# Patient Record
Sex: MALE | Race: WHITE | NOT HISPANIC OR LATINO | Employment: FULL TIME | RURAL
[De-identification: names, ages, dates, MRNs, and addresses within clinical notes are randomized per-mention and may not be internally consistent; named-entity substitution may affect disease eponyms.]

---

## 2018-02-28 ENCOUNTER — OFFICE VISIT (OUTPATIENT)
Dept: ORTHOPEDIC SURGERY | Facility: CLINIC | Age: 48
End: 2018-02-28

## 2018-02-28 ENCOUNTER — HOSPITAL ENCOUNTER (OUTPATIENT)
Dept: OTHER | Age: 48
Discharge: HOME | End: 2018-02-28
Attending: ORTHOPAEDIC SURGERY | Admitting: ORTHOPAEDIC SURGERY

## 2018-02-28 VITALS — HEIGHT: 72 IN | WEIGHT: 175 LBS | RESPIRATION RATE: 16 BRPM | BODY MASS INDEX: 23.7 KG/M2

## 2018-02-28 DIAGNOSIS — Z01.811 PRE-OP CHEST EXAM: ICD-10-CM

## 2018-02-28 DIAGNOSIS — S82.852A TRIMALLEOLAR FRACTURE OF LEFT ANKLE, CLOSED, INITIAL ENCOUNTER: Primary | ICD-10-CM

## 2018-02-28 DIAGNOSIS — Z98.890 STATUS POST OPEN REDUCTION WITH INTERNAL FIXATION (ORIF) OF FRACTURE OF ANKLE: Primary | ICD-10-CM

## 2018-02-28 DIAGNOSIS — Z87.81 STATUS POST OPEN REDUCTION WITH INTERNAL FIXATION (ORIF) OF FRACTURE OF ANKLE: Primary | ICD-10-CM

## 2018-02-28 LAB
A/G RATIO: 3.5 (ref 0.8–2)
ALBUMIN SERPL-MCNC: 5.6 G/DL (ref 3.4–4.8)
ALP BLD-CCNC: 152 U/L (ref 25–100)
ALT SERPL-CCNC: 58 U/L (ref 4–36)
ANION GAP SERPL CALCULATED.3IONS-SCNC: 11 MMOL/L (ref 3–16)
AST SERPL-CCNC: 38 U/L (ref 8–33)
BASOPHILS ABSOLUTE: 0.1 K/UL (ref 0–0.1)
BASOPHILS RELATIVE PERCENT: 0.9 %
BILIRUB SERPL-MCNC: 0.7 MG/DL (ref 0.3–1.2)
BUN BLDV-MCNC: 14 MG/DL (ref 6–20)
CALCIUM SERPL-MCNC: 9.2 MG/DL (ref 8.5–10.5)
CHLORIDE BLD-SCNC: 101 MMOL/L (ref 98–107)
CO2: 29 MMOL/L (ref 20–30)
CREAT SERPL-MCNC: 0.8 MG/DL (ref 0.4–1.2)
EOSINOPHILS ABSOLUTE: 0.7 K/UL (ref 0–0.4)
EOSINOPHILS RELATIVE PERCENT: 11.6 %
GFR AFRICAN AMERICAN: >59
GFR NON-AFRICAN AMERICAN: >59
GLOBULIN: 1.6 G/DL
GLUCOSE BLD-MCNC: 106 MG/DL (ref 74–106)
HCT VFR BLD CALC: 41.4 % (ref 40–54)
HEMOGLOBIN: 13.2 G/DL (ref 13–18)
IMMATURE GRANULOCYTES #: 0 K/UL
IMMATURE GRANULOCYTES %: 0.3 % (ref 0–5)
LYMPHOCYTES ABSOLUTE: 2 K/UL (ref 1.5–4)
LYMPHOCYTES RELATIVE PERCENT: 31.9 %
MCH RBC QN AUTO: 30.1 PG (ref 27–32)
MCHC RBC AUTO-ENTMCNC: 31.9 G/DL (ref 31–35)
MCV RBC AUTO: 94.5 FL (ref 80–100)
MONOCYTES ABSOLUTE: 0.9 K/UL (ref 0.2–0.8)
MONOCYTES RELATIVE PERCENT: 14.3 %
NEUTROPHILS ABSOLUTE: 2.6 K/UL (ref 2–7.5)
NEUTROPHILS RELATIVE PERCENT: 41 %
PDW BLD-RTO: 12.3 % (ref 11–16)
PLATELET # BLD: 333 K/UL (ref 150–400)
PMV BLD AUTO: 10 FL (ref 6–10)
POTASSIUM SERPL-SCNC: 4.3 MMOL/L (ref 3.4–5.1)
RBC # BLD: 4.38 M/UL (ref 4.5–6)
SODIUM BLD-SCNC: 141 MMOL/L (ref 136–145)
TOTAL PROTEIN: 7.2 G/DL (ref 6.4–8.3)
WBC # BLD: 6.4 K/UL (ref 4–11)

## 2018-02-28 PROCEDURE — 99203 OFFICE O/P NEW LOW 30 MIN: CPT | Performed by: ORTHOPAEDIC SURGERY

## 2018-02-28 RX ORDER — IBUPROFEN 200 MG
400 TABLET ORAL EVERY 6 HOURS PRN
COMMUNITY

## 2018-02-28 RX ORDER — HYDROCODONE BITARTRATE AND ACETAMINOPHEN 7.5; 325 MG/1; MG/1
1 TABLET ORAL EVERY 6 HOURS PRN
COMMUNITY
Start: 2018-02-25 | End: 2018-03-01 | Stop reason: HOSPADM

## 2018-02-28 RX ORDER — HYDROCODONE BITARTRATE AND ACETAMINOPHEN 7.5; 325 MG/1; MG/1
1 TABLET ORAL EVERY 6 HOURS PRN
Qty: 40 TABLET | Refills: 0 | Status: SHIPPED | OUTPATIENT
Start: 2018-02-28

## 2018-02-28 NOTE — PROGRESS NOTES
"Long Arthur is a 48 y.o. male referred by [facility] for orthopaedic evaluation and management of left ankle fracture and is here today for a discussion of treatment plans, surgery, and rehabilitation.  Pain of the Left Ankle       Leg Injury   The current episode started in the past 7 days (2-24-28 Patient states he slipped from his porch steps in the wet rain, fell and injured his left ankle.  Seen in ER for ankle fracture and treated with appropriate initial care, immobilization, NWB crutches, RICE therapy and pain control. ). The problem occurs intermittently (stabbing pain). The problem has been gradually improving. Associated symptoms include myalgias (mild right forearm minor soft tissue contusion healing well and stable.). Pertinent negatives include no abdominal pain, chest pain, fever, joint swelling or rash. Arthralgias: ankle. The symptoms are aggravated by stress, standing and walking. He has tried rest, immobilization, ice and NSAIDs (ibuprofen, elevation, non weight bearing with crutches.) for the symptoms. The treatment provided mild relief.     PAST MEDICAL HISTORY:    Past Medical History:   Diagnosis Date   • H/O exercise stress test 2016    DONE AT [facility]- \"IT WAS NORMAL\"   • Problems with swallowing     FOOD   • Wears dentures     UPPER PLATE      He reports he is in good health.  No recent illness or chronic conditions.      Current Outpatient Prescriptions:   •  HYDROcodone-acetaminophen (NORCO) 7.5-325 MG per tablet, Take 1 tablet by mouth Every 6 (Six) Hours As Needed., Disp: , Rfl:   •  HYDROcodone-acetaminophen (NORCO) 7.5-325 MG per tablet, Take 1 tablet by mouth Every 6 (Six) Hours As Needed for Moderate Pain  (PRN POST OP PAIN)., Disp: 40 tablet, Rfl: 0  •  ibuprofen (ADVIL,MOTRIN) 200 MG tablet, Take 400 mg by mouth Every 6 (Six) Hours As Needed for Mild Pain ., Disp: , Rfl:     Past Surgical History:   Procedure Laterality Date   • MOUTH SURGERY      SOME " "EXTRACTIONS       Family History   Problem Relation Age of Onset   • Family history unknown: Yes       Social History     Social History   • Marital status:      Spouse name: N/A   • Number of children: N/A   • Years of education: N/A     Occupational History   • [employer]     Social History Main Topics   • Smoking status: Former Smoker     Packs/day: 2.00     Years: 25.00     Types: Cigarettes     Quit date: 2/28/2013   • Smokeless tobacco: Never Used   • Alcohol use Yes      Comment: social   • Drug use: No   • Sexual activity: Defer     Other Topics Concern   • Not on file     Social History Narrative        Allergies   Allergen Reactions   • Penicillins Hives and Anaphylaxis     Pt states he has had this allergy since childhood       Review of Systems   Constitutional: Negative for fever.   HENT: Negative for voice change.    Eyes: Negative for visual disturbance.   Respiratory: Negative for shortness of breath.    Cardiovascular: Negative for chest pain.   Gastrointestinal: Negative for abdominal distention and abdominal pain.   Genitourinary: Negative for dysuria.   Musculoskeletal: Positive for myalgias (mild right forearm minor soft tissue contusion healing well and stable.). Negative for gait problem and joint swelling. Arthralgias: ankle.   Skin: Negative for rash.        No skin breakdown.  No fracture blisters have developed as of yet.   Neurological: Negative for speech difficulty.   Hematological: Does not bruise/bleed easily.   Psychiatric/Behavioral: Negative for confusion.       PHYSICAL EXAMINATION:       Resp 16  Ht 182.9 cm (72\")  Wt 79.4 kg (175 lb)  BMI 23.73 kg/m2    GENERAL [x] Well developed  []Ill appearing [x] No acute distress    HEENT [x]No acute changes [x] Normocephalic, atraumatic    NECK [x]Supple  [] No midline tenderness    LUNGS [x]Clear bilaterally [x]No wheezes []Rhonchi [] Rales    HEART [x] Regular rate  [x] Regular rhythm [] Irregular  "   ABDOMEN [x] Soft [x] Not tender [x] Not distended [x] Normal sounds    VAS/EXT [x] Normal Pulses []Edema []Cyanosis             SKIN [x] Warm [x]Dry []Pink []Ecchymosis []Cool    NEURO [x] Sensation Grossly Intact [x] Motor Grossly Intact [x] Pulse intact    MUSCULOSKELETAL []          Physical Exam   Constitutional: He appears well-developed. No distress.   HENT:   Head: Normocephalic and atraumatic.   Eyes: EOM are normal. Pupils are equal, round, and reactive to light.   Neck: Neck supple. No tracheal deviation present.   Cardiovascular: Normal rate and regular rhythm.    Pulmonary/Chest: Breath sounds normal. No respiratory distress. He has no wheezes.   Abdominal: Soft. Bowel sounds are normal. He exhibits no distension. There is no tenderness.   Neurological: He is alert.   Skin: Skin is warm and dry.   Psychiatric: He has a normal mood and affect. His speech is normal.   Vitals reviewed.    Left Ankle Exam   Swelling: moderate    Tenderness   Left ankle tenderness location: diffuse pain with soft moderate swelling and no tense lower leg or foot and no compartment syndrome signs.     Muscle Strength   Dorsiflexion:  4/5   Plantar flexion:  4/5     Other   Erythema: absent  Pulse: present           Neurologic Exam     Mental Status   Attention: normal.   Speech: speech is normal   Level of consciousness: alert  Knowledge: good.     Cranial Nerves     CN III, IV, VI   Pupils are equal, round, and reactive to light.  Extraocular motions are normal.     Motor Exam   Overall muscle tone: normal    Ortho Exam           Independent Review of Radiographic Studies:    No new imaging done today.  Reviewed imaging from ER showing comminuted displaced unstable trimalleolar fracture of left ankle.  Laboratory and Other Studies:  No new results reviewed today.   Medical Decision Making:    Stable initial neurovascular and fracture exam.  Initial visit for left ankle fracture with recommendation for surgical  ORIF.      *SPECIAL INSTRUCTIONS:  Multi-modal analgesia.  Multi-modal DVT prophylaxis.  Rehabilitation PT/OT planned.    Long was seen today for pain.    Diagnoses and all orders for this visit:    Trimalleolar fracture of left ankle, closed, initial encounter  -     Case Request; Standing  -     CBC and Differential  -     Comprehensive metabolic panel  -     ECG 12 Lead  -     XR chest 2 vw; Future  -     famotidine (PEPCID) injection 20 mg; Infuse 2 mL into a venous catheter 60 Minutes Prior to Surgery.  -     clindamycin (CLEOCIN) 900 mg in dextrose (D5W) 5 % 100 mL IVPB; Infuse 900 mg into a venous catheter On Call to the Operating Room.  -     Case Request    Other orders  -     Follow anesthesia standing orders.  -     Provide instructions to patient regarding NPO status  -     Obtain informed consent  -     Clorhexidine skin prep  -     Follow Anesthesia Guidelines / Standing Orders; Standing  -     Notify physician (specify); Standing          Risks, benefits, and alternative treatments discussed with the patient: [x] Yes [] No    Risk benefits and merits of the proposed surgery were discussed and the patient's questions were answered risks discussed including and not limited to:  Anesthesia reactions  Infection  Deep venous thrombosis and pulmonary embolus  Nerve, vascular or tendon injury  Fracture  Deformity  Stiffness  Weakness  Prosthesis and implant issues such as loosening, material wear or dislocation  Skin necrosis  Revision surgery or further treatment  Recurrence of problem and condition     Informed consent: [] Signed  [x] To be obtained at hospital  [] Both    Recommendations/Plan:    Regular exercise as tolerated  Orthopedic activities reviewed and patient expressed appreciation  Discussion of orthopedic goals  Risk, benefits, and merits of treatment alternatives reviewed with the patient and questions answered  The nature of the proposed surgery reviewed with the patient including risks,  benefits, rehabilitation, recovery timeframe, and outcome expectations  Take prescribed medications as instructed only as tolerated  Use brace as instructed  Elevate leg for residual swelling  Reduced physical activity as appropriate  Weight bearing parameters reviewed  Ice, heat, and/or modalities as beneficial  Guided on proper techniques for mobility, strength, agility and/or conditioning exercises    Exercise, medications, injections, other patient advice, and return appointment as noted.  Brace: No brace was given at today's visit  Referral: No referrals made at today's visit  Test/Studies: No additional studies ordered.  Surgery: Surgery proposed at this visit as noted.  Work/Activity Status: No strenuous activity., No contact sports and No driving on pain medication  Patient is encouraged to call or return for any issues or concerns.    PLANNED SURGICAL PROCEDURE: Open Reduction Internal Fixation of Left Ankle Trimalleolar Fracture.    Return in about 9 days (around 3/9/2018) for Post-op, Recheck.

## 2018-02-28 NOTE — PROGRESS NOTES
Subjective   Patient ID: Long Arthur is a 48 y.o. right hand dominant male is being seen for orthopaedic evaluation today for left ankle Pain of the Left Ankle         HPI Comments: Patient fell on front porch Saturday night after being in rain. Patient is taking ibuprofen and resting with limited relief.      Pain   This is a new problem. The current episode started in the past 7 days. The problem occurs intermittently. The problem has been unchanged. Associated symptoms include arthralgias. Pertinent negatives include no abdominal pain, chest pain, fever, joint swelling or rash. The symptoms are aggravated by standing. He has tried NSAIDs and ice (Ibuprophen) for the symptoms. The treatment provided mild relief.       Pain Score: 7  Pain Location: Ankle  Pain Orientation: Left     Pain Descriptors: Stabbing  Pain Frequency: Intermittent  Pain Onset: Sudden  Date Pain First Started: 02/24/18  Clinical Progression: Not changed  Aggravating Factors: Walking, Standing        Pain Intervention(s): Rest, Home medication (ibuprophen)  Result of Injury: Yes  Work-Related Injury: No    History reviewed. No pertinent past medical history.     History reviewed. No pertinent surgical history.    Family History   Problem Relation Age of Onset   • Family history unknown: Yes       Social History     Social History   • Marital status:      Spouse name: N/A   • Number of children: N/A   • Years of education: N/A     Occupational History   • Not on file.     Social History Main Topics   • Smoking status: Former Smoker     Quit date: 2/28/2013   • Smokeless tobacco: Never Used   • Alcohol use Yes      Comment: social   • Drug use: No   • Sexual activity: Defer     Other Topics Concern   • Not on file     Social History Narrative   • No narrative on file       Allergies   Allergen Reactions   • Penicillins Hives       Review of Systems   Constitutional: Negative for fever.   HENT: Negative for voice change.    Eyes: Negative  "for visual disturbance.   Respiratory: Negative for shortness of breath.    Cardiovascular: Negative for chest pain.   Gastrointestinal: Negative for abdominal distention and abdominal pain.   Genitourinary: Negative for dysuria.   Musculoskeletal: Positive for arthralgias. Negative for gait problem and joint swelling.   Skin: Negative for rash.   Neurological: Negative for speech difficulty.   Hematological: Does not bruise/bleed easily.   Psychiatric/Behavioral: Negative for confusion.       Objective   Resp 16  Ht 182.9 cm (72\")  Wt 79.4 kg (175 lb)  BMI 23.73 kg/m2   Physical Exam  Ortho Exam   Extremity DVT signs are {[facility] Justo:79083}   Neurologic Exam   Ortho Exam         Assessment/Plan   Independent Review of Radiographic Studies:    {[facility] Imagin}  Laboratory and Other Studies:  {[facility] Lab Studies:10282}   Medical Decision Making:    {[facility] Clinical Progress, Decision Making, and Outcome:92614}    Procedures  [] No procedures were performed in office today.     There are no diagnoses linked to this encounter.   {[facility] Patient Advice:74887::\"Regular exercise as tolerated\",\"Orthopedic activities reviewed and patient expressed appreciation\",\"Discussion of orthopedic goals\",\"Risk, benefits, and merits of treatment alternatives reviewed with the patient and questions answered\"}    Recommendations/Plan:  {[facility] Recommendations/Plan:61618::\"Exercise, medications, injections, other patient advice, and return appointment as noted.\",\"Patient is encouraged to call or return for any issues or concerns.\"}    No Follow-up on file.  Patient agreeable to call or return sooner for any concerns.      Scribed for Joss Guerrier MD by Verna Valentine R.N.. 2018  10:16 AM                "

## 2018-03-01 ENCOUNTER — APPOINTMENT (OUTPATIENT)
Dept: GENERAL RADIOLOGY | Facility: HOSPITAL | Age: 48
End: 2018-03-01

## 2018-03-01 ENCOUNTER — HOSPITAL ENCOUNTER (OUTPATIENT)
Facility: HOSPITAL | Age: 48
Setting detail: HOSPITAL OUTPATIENT SURGERY
Discharge: HOME | End: 2018-03-01
Attending: ORTHOPAEDIC SURGERY | Admitting: ORTHOPAEDIC SURGERY

## 2018-03-01 ENCOUNTER — ANESTHESIA (OUTPATIENT)
Dept: PERIOP | Facility: HOSPITAL | Age: 48
End: 2018-03-01

## 2018-03-01 ENCOUNTER — ANESTHESIA EVENT (OUTPATIENT)
Dept: PERIOP | Facility: HOSPITAL | Age: 48
End: 2018-03-01

## 2018-03-01 VITALS
DIASTOLIC BLOOD PRESSURE: 79 MMHG | TEMPERATURE: 97.9 F | SYSTOLIC BLOOD PRESSURE: 142 MMHG | RESPIRATION RATE: 16 BRPM | WEIGHT: 175 LBS | OXYGEN SATURATION: 96 % | HEIGHT: 72 IN | HEART RATE: 81 BPM | BODY MASS INDEX: 23.7 KG/M2

## 2018-03-01 DIAGNOSIS — S82.852A TRIMALLEOLAR FRACTURE OF LEFT ANKLE, CLOSED, INITIAL ENCOUNTER: ICD-10-CM

## 2018-03-01 PROCEDURE — 27822 TREATMENT OF ANKLE FRACTURE: CPT | Performed by: ORTHOPAEDIC SURGERY

## 2018-03-01 PROCEDURE — C1713 ANCHOR/SCREW BN/BN,TIS/BN: HCPCS | Performed by: ORTHOPAEDIC SURGERY

## 2018-03-01 PROCEDURE — 25010000002 FENTANYL CITRATE (PF) 100 MCG/2ML SOLUTION: Performed by: NURSE ANESTHETIST, CERTIFIED REGISTERED

## 2018-03-01 PROCEDURE — 76000 FLUOROSCOPY <1 HR PHYS/QHP: CPT

## 2018-03-01 PROCEDURE — 25010000002 ONDANSETRON PER 1 MG: Performed by: NURSE ANESTHETIST, CERTIFIED REGISTERED

## 2018-03-01 PROCEDURE — 25010000002 PROPOFOL 200 MG/20ML EMULSION: Performed by: NURSE ANESTHETIST, CERTIFIED REGISTERED

## 2018-03-01 PROCEDURE — 25010000002 DEXAMETHASONE PER 1 MG: Performed by: NURSE ANESTHETIST, CERTIFIED REGISTERED

## 2018-03-01 PROCEDURE — 25010000002 MIDAZOLAM PER 1 MG: Performed by: NURSE ANESTHETIST, CERTIFIED REGISTERED

## 2018-03-01 DEVICE — PLT TBG 1/3 LCP W COL 7HL 81MM: Type: IMPLANTABLE DEVICE | Site: ANKLE | Status: FUNCTIONAL

## 2018-03-01 DEVICE — SCRW CANC LCP PT SS 4X50MM: Type: IMPLANTABLE DEVICE | Site: ANKLE | Status: FUNCTIONAL

## 2018-03-01 DEVICE — ISOLA SPINE SYSTEM ROD BENDERS (TUBULAR) SET
Type: IMPLANTABLE DEVICE | Site: ANKLE | Status: FUNCTIONAL
Brand: ISOLA

## 2018-03-01 DEVICE — SCRW LK S/TAP STRDRV 3.5X14MM: Type: IMPLANTABLE DEVICE | Site: ANKLE | Status: FUNCTIONAL

## 2018-03-01 DEVICE — SCRW CANC FUL/THRD 4.0X14MM: Type: IMPLANTABLE DEVICE | Site: ANKLE | Status: FUNCTIONAL

## 2018-03-01 DEVICE — SCRW CANC LCP PT 4X45MM: Type: IMPLANTABLE DEVICE | Site: ANKLE | Status: FUNCTIONAL

## 2018-03-01 DEVICE — SCRW CORT S/TAP 3.5X14MM: Type: IMPLANTABLE DEVICE | Site: ANKLE | Status: FUNCTIONAL

## 2018-03-01 DEVICE — SCRW LK S/TAP STRDRV 3.5X16MM: Type: IMPLANTABLE DEVICE | Site: ANKLE | Status: FUNCTIONAL

## 2018-03-01 DEVICE — SCRW CORT S/TAP 3.5X12MM: Type: IMPLANTABLE DEVICE | Site: ANKLE | Status: FUNCTIONAL

## 2018-03-01 RX ORDER — MORPHINE SULFATE 2 MG/ML
2 INJECTION, SOLUTION INTRAMUSCULAR; INTRAVENOUS
Status: DISCONTINUED | OUTPATIENT
Start: 2018-03-01 | End: 2018-03-01 | Stop reason: HOSPADM

## 2018-03-01 RX ORDER — ONDANSETRON 2 MG/ML
INJECTION INTRAMUSCULAR; INTRAVENOUS AS NEEDED
Status: DISCONTINUED | OUTPATIENT
Start: 2018-03-01 | End: 2018-03-01 | Stop reason: SURG

## 2018-03-01 RX ORDER — CLINDAMYCIN PHOSPHATE 150 MG/ML
INJECTION, SOLUTION INTRAVENOUS AS NEEDED
Status: DISCONTINUED | OUTPATIENT
Start: 2018-03-01 | End: 2018-03-01 | Stop reason: HOSPADM

## 2018-03-01 RX ORDER — PROPOFOL 10 MG/ML
INJECTION, EMULSION INTRAVENOUS AS NEEDED
Status: DISCONTINUED | OUTPATIENT
Start: 2018-03-01 | End: 2018-03-01 | Stop reason: SURG

## 2018-03-01 RX ORDER — MAGNESIUM HYDROXIDE 1200 MG/15ML
LIQUID ORAL AS NEEDED
Status: DISCONTINUED | OUTPATIENT
Start: 2018-03-01 | End: 2018-03-01 | Stop reason: HOSPADM

## 2018-03-01 RX ORDER — ONDANSETRON 2 MG/ML
4 INJECTION INTRAMUSCULAR; INTRAVENOUS ONCE AS NEEDED
Status: DISCONTINUED | OUTPATIENT
Start: 2018-03-01 | End: 2018-03-01 | Stop reason: HOSPADM

## 2018-03-01 RX ORDER — FENTANYL CITRATE 50 UG/ML
INJECTION, SOLUTION INTRAMUSCULAR; INTRAVENOUS AS NEEDED
Status: DISCONTINUED | OUTPATIENT
Start: 2018-03-01 | End: 2018-03-01 | Stop reason: SURG

## 2018-03-01 RX ORDER — SODIUM CHLORIDE, SODIUM LACTATE, POTASSIUM CHLORIDE, CALCIUM CHLORIDE 600; 310; 30; 20 MG/100ML; MG/100ML; MG/100ML; MG/100ML
1000 INJECTION, SOLUTION INTRAVENOUS CONTINUOUS PRN
Status: DISCONTINUED | OUTPATIENT
Start: 2018-03-01 | End: 2018-03-01 | Stop reason: HOSPADM

## 2018-03-01 RX ORDER — SODIUM CHLORIDE 0.9 % (FLUSH) 0.9 %
3 SYRINGE (ML) INJECTION AS NEEDED
Status: DISCONTINUED | OUTPATIENT
Start: 2018-03-01 | End: 2018-03-01 | Stop reason: HOSPADM

## 2018-03-01 RX ORDER — ACETAMINOPHEN 500 MG
1000 TABLET ORAL EVERY 6 HOURS PRN
COMMUNITY
End: 2018-03-01 | Stop reason: HOSPADM

## 2018-03-01 RX ORDER — FAMOTIDINE 10 MG/ML
INJECTION, SOLUTION INTRAVENOUS
Status: COMPLETED
Start: 2018-03-01 | End: 2018-03-01

## 2018-03-01 RX ORDER — MIDAZOLAM HYDROCHLORIDE 1 MG/ML
INJECTION INTRAMUSCULAR; INTRAVENOUS AS NEEDED
Status: DISCONTINUED | OUTPATIENT
Start: 2018-03-01 | End: 2018-03-01 | Stop reason: SURG

## 2018-03-01 RX ORDER — DEXAMETHASONE SODIUM PHOSPHATE 4 MG/ML
INJECTION, SOLUTION INTRA-ARTICULAR; INTRALESIONAL; INTRAMUSCULAR; INTRAVENOUS; SOFT TISSUE AS NEEDED
Status: DISCONTINUED | OUTPATIENT
Start: 2018-03-01 | End: 2018-03-01 | Stop reason: SURG

## 2018-03-01 RX ORDER — MEPERIDINE HYDROCHLORIDE 50 MG/ML
25 INJECTION INTRAMUSCULAR; INTRAVENOUS; SUBCUTANEOUS
Status: DISCONTINUED | OUTPATIENT
Start: 2018-03-01 | End: 2018-03-01 | Stop reason: HOSPADM

## 2018-03-01 RX ORDER — CLINDAMYCIN PHOSPHATE 150 MG/ML
INJECTION, SOLUTION INTRAVENOUS
Status: DISCONTINUED
Start: 2018-03-01 | End: 2018-03-01 | Stop reason: HOSPADM

## 2018-03-01 RX ORDER — CLINDAMYCIN PHOSPHATE 900 MG/50ML
900 INJECTION, SOLUTION INTRAVENOUS
Status: COMPLETED | OUTPATIENT
Start: 2018-03-01 | End: 2018-03-01

## 2018-03-01 RX ADMIN — FAMOTIDINE 20 MG: 10 INJECTION INTRAVENOUS at 10:53

## 2018-03-01 RX ADMIN — PROPOFOL 200 MG: 10 INJECTION, EMULSION INTRAVENOUS at 12:10

## 2018-03-01 RX ADMIN — Medication 8 ML/HR: at 14:38

## 2018-03-01 RX ADMIN — SODIUM CHLORIDE, POTASSIUM CHLORIDE, SODIUM LACTATE AND CALCIUM CHLORIDE 1000 ML: 600; 310; 30; 20 INJECTION, SOLUTION INTRAVENOUS at 10:52

## 2018-03-01 RX ADMIN — CLINDAMYCIN PHOSPHATE 900 MG: 900 INJECTION, SOLUTION INTRAVENOUS at 12:07

## 2018-03-01 RX ADMIN — DEXAMETHASONE SODIUM PHOSPHATE 4 MG: 4 INJECTION, SOLUTION INTRAMUSCULAR; INTRAVENOUS at 12:16

## 2018-03-01 RX ADMIN — FENTANYL CITRATE 100 MCG: 50 INJECTION, SOLUTION INTRAMUSCULAR; INTRAVENOUS at 12:10

## 2018-03-01 RX ADMIN — MIDAZOLAM HYDROCHLORIDE 2 MG: 1 INJECTION, SOLUTION INTRAMUSCULAR; INTRAVENOUS at 12:10

## 2018-03-01 RX ADMIN — ONDANSETRON 4 MG: 2 INJECTION INTRAMUSCULAR; INTRAVENOUS at 12:16

## 2018-03-01 NOTE — H&P (VIEW-ONLY)
"Long Arthur is a 48 y.o. male referred by [facility] ER for orthopaedic evaluation and management of left ankle fracture and is here today for a discussion of treatment plans, surgery, and rehabilitation.  Pain of the Left Ankle       Leg Injury   The current episode started in the past 7 days (2-24-28 Patient states he slipped from his porch steps in the wet rain, fell and injured his left ankle.  Seen in ER for ankle fracture and treated with appropriate initial care, immobilization, NWB crutches, RICE therapy and pain control. ). The problem occurs intermittently (stabbing pain). The problem has been gradually improving. Associated symptoms include myalgias (mild right forearm minor soft tissue contusion healing well and stable.). Pertinent negatives include no abdominal pain, chest pain, fever, joint swelling or rash. Arthralgias: ankle. The symptoms are aggravated by stress, standing and walking. He has tried rest, immobilization, ice and NSAIDs (ibuprofen, elevation, non weight bearing with crutches.) for the symptoms. The treatment provided mild relief.     PAST MEDICAL HISTORY:    Past Medical History:   Diagnosis Date   • H/O exercise stress test 2016    DONE AT [facility]- \"IT WAS NORMAL\"   • Problems with swallowing     FOOD   • Wears dentures     UPPER PLATE      He reports he is in good health.  No recent illness or chronic conditions.      Current Outpatient Prescriptions:   •  HYDROcodone-acetaminophen (NORCO) 7.5-325 MG per tablet, Take 1 tablet by mouth Every 6 (Six) Hours As Needed., Disp: , Rfl:   •  HYDROcodone-acetaminophen (NORCO) 7.5-325 MG per tablet, Take 1 tablet by mouth Every 6 (Six) Hours As Needed for Moderate Pain  (PRN POST OP PAIN)., Disp: 40 tablet, Rfl: 0  •  ibuprofen (ADVIL,MOTRIN) 200 MG tablet, Take 400 mg by mouth Every 6 (Six) Hours As Needed for Mild Pain ., Disp: , Rfl:     Past Surgical History:   Procedure Laterality Date   • MOUTH SURGERY      SOME " "EXTRACTIONS       Family History   Problem Relation Age of Onset   • Family history unknown: Yes       Social History     Social History   • Marital status:      Spouse name: N/A   • Number of children: N/A   • Years of education: N/A     Occupational History   • [employer]     Social History Main Topics   • Smoking status: Former Smoker     Packs/day: 2.00     Years: 25.00     Types: Cigarettes     Quit date: 2/28/2013   • Smokeless tobacco: Never Used   • Alcohol use Yes      Comment: social   • Drug use: No   • Sexual activity: Defer     Other Topics Concern   • Not on file     Social History Narrative        Allergies   Allergen Reactions   • Penicillins Hives and Anaphylaxis     Pt states he has had this allergy since childhood       Review of Systems   Constitutional: Negative for fever.   HENT: Negative for voice change.    Eyes: Negative for visual disturbance.   Respiratory: Negative for shortness of breath.    Cardiovascular: Negative for chest pain.   Gastrointestinal: Negative for abdominal distention and abdominal pain.   Genitourinary: Negative for dysuria.   Musculoskeletal: Positive for myalgias (mild right forearm minor soft tissue contusion healing well and stable.). Negative for gait problem and joint swelling. Arthralgias: ankle.   Skin: Negative for rash.        No skin breakdown.  No fracture blisters have developed as of yet.   Neurological: Negative for speech difficulty.   Hematological: Does not bruise/bleed easily.   Psychiatric/Behavioral: Negative for confusion.       PHYSICAL EXAMINATION:       Resp 16  Ht 182.9 cm (72\")  Wt 79.4 kg (175 lb)  BMI 23.73 kg/m2    GENERAL [x] Well developed  []Ill appearing [x] No acute distress    HEENT [x]No acute changes [x] Normocephalic, atraumatic    NECK [x]Supple  [] No midline tenderness    LUNGS [x]Clear bilaterally [x]No wheezes []Rhonchi [] Rales    HEART [x] Regular rate  [x] Regular rhythm [] Irregular  "   ABDOMEN [x] Soft [x] Not tender [x] Not distended [x] Normal sounds    VAS/EXT [x] Normal Pulses []Edema []Cyanosis             SKIN [x] Warm [x]Dry []Pink []Ecchymosis []Cool    NEURO [x] Sensation Grossly Intact [x] Motor Grossly Intact [x] Pulse intact    MUSCULOSKELETAL []          Physical Exam   Constitutional: He appears well-developed. No distress.   HENT:   Head: Normocephalic and atraumatic.   Eyes: EOM are normal. Pupils are equal, round, and reactive to light.   Neck: Neck supple. No tracheal deviation present.   Cardiovascular: Normal rate and regular rhythm.    Pulmonary/Chest: Breath sounds normal. No respiratory distress. He has no wheezes.   Abdominal: Soft. Bowel sounds are normal. He exhibits no distension. There is no tenderness.   Neurological: He is alert.   Skin: Skin is warm and dry.   Psychiatric: He has a normal mood and affect. His speech is normal.   Vitals reviewed.    Left Ankle Exam   Swelling: moderate    Tenderness   Left ankle tenderness location: diffuse pain with soft moderate swelling and no tense lower leg or foot and no compartment syndrome signs.     Muscle Strength   Dorsiflexion:  4/5   Plantar flexion:  4/5     Other   Erythema: absent  Pulse: present           Neurologic Exam     Mental Status   Attention: normal.   Speech: speech is normal   Level of consciousness: alert  Knowledge: good.     Cranial Nerves     CN III, IV, VI   Pupils are equal, round, and reactive to light.  Extraocular motions are normal.     Motor Exam   Overall muscle tone: normal    Ortho Exam           Independent Review of Radiographic Studies:    No new imaging done today.  Reviewed imaging from ER showing comminuted displaced unstable trimalleolar fracture of left ankle.  Laboratory and Other Studies:  No new results reviewed today.   Medical Decision Making:    Stable initial neurovascular and fracture exam.  Initial visit for left ankle fracture with recommendation for surgical  ORIF.      *SPECIAL INSTRUCTIONS:  Multi-modal analgesia.  Multi-modal DVT prophylaxis.  Rehabilitation PT/OT planned.    Long was seen today for pain.    Diagnoses and all orders for this visit:    Trimalleolar fracture of left ankle, closed, initial encounter  -     Case Request; Standing  -     CBC and Differential  -     Comprehensive metabolic panel  -     ECG 12 Lead  -     XR chest 2 vw; Future  -     famotidine (PEPCID) injection 20 mg; Infuse 2 mL into a venous catheter 60 Minutes Prior to Surgery.  -     clindamycin (CLEOCIN) 900 mg in dextrose (D5W) 5 % 100 mL IVPB; Infuse 900 mg into a venous catheter On Call to the Operating Room.  -     Case Request    Other orders  -     Follow anesthesia standing orders.  -     Provide instructions to patient regarding NPO status  -     Obtain informed consent  -     Clorhexidine skin prep  -     Follow Anesthesia Guidelines / Standing Orders; Standing  -     Notify physician (specify); Standing          Risks, benefits, and alternative treatments discussed with the patient: [x] Yes [] No    Risk benefits and merits of the proposed surgery were discussed and the patient's questions were answered risks discussed including and not limited to:  Anesthesia reactions  Infection  Deep venous thrombosis and pulmonary embolus  Nerve, vascular or tendon injury  Fracture  Deformity  Stiffness  Weakness  Prosthesis and implant issues such as loosening, material wear or dislocation  Skin necrosis  Revision surgery or further treatment  Recurrence of problem and condition     Informed consent: [] Signed  [x] To be obtained at hospital  [] Both    Recommendations/Plan:    Regular exercise as tolerated  Orthopedic activities reviewed and patient expressed appreciation  Discussion of orthopedic goals  Risk, benefits, and merits of treatment alternatives reviewed with the patient and questions answered  The nature of the proposed surgery reviewed with the patient including risks,  benefits, rehabilitation, recovery timeframe, and outcome expectations  Take prescribed medications as instructed only as tolerated  Use brace as instructed  Elevate leg for residual swelling  Reduced physical activity as appropriate  Weight bearing parameters reviewed  Ice, heat, and/or modalities as beneficial  Guided on proper techniques for mobility, strength, agility and/or conditioning exercises    Exercise, medications, injections, other patient advice, and return appointment as noted.  Brace: No brace was given at today's visit  Referral: No referrals made at today's visit  Test/Studies: No additional studies ordered.  Surgery: Surgery proposed at this visit as noted.  Work/Activity Status: No strenuous activity., No contact sports and No driving on pain medication  Patient is encouraged to call or return for any issues or concerns.    PLANNED SURGICAL PROCEDURE: Open Reduction Internal Fixation of Left Ankle Trimalleolar Fracture.    Return in about 9 days (around 3/9/2018) for Post-op, Recheck.

## 2018-03-01 NOTE — PLAN OF CARE
Problem: Perioperative Period (Adult)  Intervention: Promote Pulmonary Hygiene and Secretion Clearance   03/01/18 1412   Promote Aggressive Pulmonary Hygiene/Secretion Management   Cough And Deep Breathing done with encouragement   Positioning   Head Of Bed (HOB) Position HOB at 20 degrees   Activity   Activity Type activity adjusted per tolerance;dorsiflexion, plantar flexion encouraged     Intervention: Monitor/Manage Pain   03/01/18 1412   Safety Interventions   Medication Review/Management medications reviewed   Manage Acute Burn Pain   Bowel Intervention adequate fluid intake promoted   Pain Management Interventions cold applied;pillow support;pain care plan reviewed with patient/caregiver     Intervention: Promote Normothermia   03/01/18 1412   Cardiac Interventions   Warming Thermoregulation Maintenance warm blankets applied       Goal: Signs and Symptoms of Listed Potential Problems Will be Absent or Manageable (Perioperative Period)  Outcome: Ongoing (interventions implemented as appropriate)   03/01/18 1412   Perioperative Period   Problems Assessed (Perioperative Period) all   Problems Present (Perioperative Period) none

## 2018-03-01 NOTE — INTERVAL H&P NOTE
"H&P reviewed. The patient was examined and there are no changes to the H&P.     Ht 182.9 cm (72\")  Wt 79.4 kg (175 lb)  BMI 23.73 kg/m2      Joss Guerrier MD  3/1/2018  10:33 AM    "

## 2018-03-01 NOTE — ANESTHESIA PREPROCEDURE EVALUATION
Anesthesia Evaluation     Patient summary reviewed and Nursing notes reviewed   no history of anesthetic complications:  NPO Solid Status: > 8 hours  NPO Liquid Status: > 8 hours           Airway   Mallampati: I  TM distance: >3 FB  Neck ROM: full  no difficulty expected  Dental - normal exam   (+) upper dentures    Pulmonary - negative pulmonary ROS and normal exam   Cardiovascular - negative cardio ROS and normal exam        Neuro/Psych- negative ROS  GI/Hepatic/Renal/Endo - negative ROS     Musculoskeletal (-) negative ROS    Abdominal    Substance History - negative use     OB/GYN negative ob/gyn ROS         Other - negative ROS                     Anesthesia Plan    ASA 2     general   (Discussed popliteal block/saphenous nerve block for postop pain management.  Patient agrees to this and we will provide block postoperative in PACU.  Kali Goyal CRNA)  intravenous induction   Anesthetic plan and risks discussed with patient.

## 2018-03-01 NOTE — DISCHARGE INSTRUCTIONS
"ON-Q PATIENT INSTRUCTIONS    You have had regional anesthesia with a catheter as part of your anesthetic care.    Because of this your extremity may be numb and very weak.  You must protect   your extremity.  Wear the sling if your surgeon has given you one.  Take care to   avoid hot, very cold or sharp items.  As the block wears off, you may have a tingling   or a \"pins and needles\" sensation in your arm and hand.  This is normal.    The ON-Q pain pump is infusing medicine all the time which will help control your   pain as the block wears off.  Your extremity may not be as numb after the first 12 to   18 hours.    Do not tug on or kink the catheter.  Keep the insertion site into the skin and any   connections clean.    CALL ANESTHESIA  IMMEDIATELY FOR:     -A metallic taste in your mouth       -Ringing in the ears        -Persistent tremors or shakes or a seizure      -Redness, pain or swelling at the catheter insertion site    -A cold, dusky or dark extremity   -Severe pain and you can't move your fingers or toes    The ON-Q pain ball is initially set at _____  mL/hour. The black arrow at the top of the   dial points to the rate the medication is being delivered. Do not set the pump in between   the numbers as it will not work correctly. The white clamp must be open at all times.    If you are having pain, increase the pain ball rate  to 14 ml/hour for ONE hour.   Then return to your original rate, or if pain is not adequately relieved you may increase it by 2mL/hour.  If your extremity is too numb, you may turn down the pain ball 2 mL/hour every 2 hours.    Do not titrate down too fast; you may then experience pain.    You may also take the prescribed pain medication from your doctor.     The pain ball and catheter may stay in up to 4 days.    Leaking at the catheter site may occur.  The pain ball is still working if it's controlling your pain.    Reinforce the dressing with additional tegaderm.    When the pain " ball is empty it will be flat.  Remove the catheter by pulling off the dressing slowly   and pull the catheter out of the skin.  Confirm that the tip of the catheter is intact once removed.   If the catheter is stuck but not hurting, reposition your extremity and keep pulling slowly until   removed.  If the catheter is hurting and won't pull out,   CALL THE NUMBER BELOW:    DO NOT CUT THE CATHETER FOR ANY REASON    When treatment is completed, dispose of the catheter and pain ball.    PRIMARY CONTACT: Anesthesia Service   (Mon-Fri 7:00 AM-3:00 PM): [phone]    After 3:00 PM: [phone] (Tell the hospital  you have a pain pump and need  to speak with anesthesia)      Frequently Asked questions about Pain Blocks    1. What are the advantages of having a nerve block?    A nerve block decreases the pain after surgery and lessens the need for narcotics. Narcotics cause nausea, vomiting, sleepiness, constipation and delayed mobility.  2. Will the procedure hurt?   You will be given sedation for the procedure. We need you to be alert enough to follow instructions during the block.  3. Am I required to have a nerve block?    No, this is a service that we offer to improve comfort and reduce pain medication requirement following surgery. You can refuse to have a nerve block.      Single Injection    1. Is it normal for my extremity to be numb after 16 hours?  Yes.  Weakness and numbness can last 24 hours or more. If you are concerned call Anesthesia.     2. After shoulder surgery my eyelid on the same side as my surgery Is dropping. Is this normal?   The medication injected may contact nerves that affect your eyelid and cause drooping. This will wear off as the pain block wears. If you are concerned call Anesthesia.    3. After shoulder surgery it feels like it is hard to take a deep breath. Is this normal?   Yes.  This will go away as the medication for the block wears off. If you are extremely short of breath  call 911.      Continuous Infusion with OnQ Pump    1. Who do I call if I  have a question or concern about the OnQ pump?  [employer] [phone]  (24-hour product support)    2. Can I get the clear dressing wet when Itake a shower?  No. This dressing must remain dry or It will loosen and the catheter may come out.    3. Does the catheter need to be removed immediately after the pain ball is empty?   No. The empty pain pump can remain in place until it is convenient to be removed. However, It is important that the catheter does get removed as soon as possible after completion.    4. If I decide I don't like the catheter after it Is placed, do I have to wait for the pain ball to go flat before Ican remove it?   No the catheter can be removed at anytime . Once it is removed it cannot be replaced.    5. If I accidentally pull the catheter out, will I be causing any problems?   No. Be aware that the pain block will wear off in 2-4 hours so you must begin taking pain medication as prescribed.     6. How do I know if the medication Is infusing?   You pain ball will begin to shrink as the medication goes in. Then after 24 hours you will notice that the pain ball begins to get smaller.    7. What do I do If I notice clear or pink colored drainage collecting under the dressing?    Drainage around the catheter site is normal and can be clear, pink, and sometimes red. You can reinforce the dressing with anything that will absorb drainage.    8. When I remove the catheter should I expect some bleeding?   Yes. It is not uncommon for small amount of bleeding to occur after the catheter is removed. Apply direct pressure for 5 minutes and cover with a Band-Aid.    9. Will I need to take the pain medication ordered by my surgeon?   If you are going home on the day of surgery get your prescription filled. The pain ball will help decrease the need for pain medications but sometimes it is  necessary to take prescribed pain  medication. If you are staying in the hospital overnight, you must communicate with your nurse about your pain level.          I have received a copy these instructions.     __________________________________________________________       Patient Signature    *Please ensure that patient receives a copy and a signed copy is placed in chart*    Please follow all post op instructions and follow up appointment time from your physician's office included in your discharge packet.  .   Keep the affected extremity elevated above  level of the heart.  Use your ice pack as instructed, do not use continuously.  Use your crutches as directed  Follow your physicians instructions as previously directed.  No pushing, pulling, tugging,  heavy lifting, or strenuous activity.  No major decision making, driving, or drinking alcoholic beverages for 24 hours. ( due to the medications you have  received)  Always use good hand hygiene/washing techniques.  NO driving while taking pain medications.  To assist you in voiding:  Drink plenty of fluids  Listen to running water while attempting to void.    If you are unable to urinate and you have an uncomfortable urge to void or it has been   6 hours since you were discharged, return to the Emergency Room

## 2018-03-01 NOTE — PLAN OF CARE
Problem: Patient Care Overview (Adult)  Goal: Plan of Care Review  Outcome: Ongoing (interventions implemented as appropriate)   03/01/18 1512   Coping/Psychosocial Response Interventions   Plan Of Care Reviewed With patient   Patient Care Overview   Progress progress toward functional goals as expected   Outcome Evaluation   Outcome Summary/Follow up Plan VSS, PACU CARE COMPLETE, TO SDS IN STABLE CONDITION

## 2018-03-01 NOTE — ANESTHESIA PROCEDURE NOTES
Peripheral Block    Patient location during procedure: OR  Start time: 3/1/2018 1:46 PM  Reason for block: at surgeon's request and post-op pain management  Performed by  CRNA: BHAVYA CAGLE  Preanesthetic Checklist  Completed: patient identified, site marked, surgical consent, pre-op evaluation, timeout performed, IV checked, risks and benefits discussed and monitors and equipment checked  Prep:  Sterile barriers:cap, gloves, mask and sterile barriers  Prep: ChloraPrep  Patient monitoring: blood pressure monitoring, continuous pulse oximetry and EKG  Procedure  Sedation:yes    Guidance:ultrasound guided  ULTRASOUND INTERPRETATION. Using ultrasound guidance a gauge needle was placed in close proximity to the femoral nerve, at which point, under ultrasound guidance anesthetic was injected in the area of the nerve and spread of the anesthesia was seen on ultrasound in close proximity thereto.  There were no abnormalities seen on ultrasound; a digital image was taken; and the patient tolerated the procedure with no complications. Images:still images obtained    Laterality:left  Block Type:popliteal  Injection Technique:catheter  Needle Type:echogenic  Needle Gauge:18 G  Resistance on Injection: none  Catheter size: 21.  Cath Depth at skin: 5 cm  Medications  Local Injected:ropivacaine 0.5% Local Amount Injected:20mL  Post Assessment  Injection Assessment: negative aspiration for heme, no paresthesia on injection and incremental injection  Patient Tolerance:comfortable throughout block  Complications:no  Additional Notes  Procedure:                    POPLITEAL CATHETER at skin: 0 and 5                                            Patient analgesia was achieved with General Anesthesia      The pt was placed in a Supine with Leg Elevated position.  The Insertion site was  prepped and draped in sterile fashion.  Skin and cutaneous tissue was infiltrated and anesthetized with 1% Lidocaine via a 25g needle.  A  I-Flow 18 ga echogenic needle was then  inserted approximately 3 cm proximal to the popliteal fossa at the lateral mid biceps femoris and advanced In-plane with Ultrasound guidance. The popliteal artery was visualized and the common peroneal and tibial bifurcation was located.  LA injection spread was visualized, injection was incremental 1-5 ml; injection pressure was normal or little; no intraneural injection; no vascular injection.  A I-Flow  20g catheter was placed via the needle with ultrasound visualization and confirmation with Normal Saline or Air bolus.The needle was then removed and the skin was sealed with Skin Affiix at catheter insertion site.  Skin was prepped with benzoin or mastisol and the labeled curled catheter was secured with steristrips and a transparent dressing.

## 2018-03-01 NOTE — OP NOTE
[address],  [address]  [place]  [address]  [phone]      OPERATIVE REPORT      PATIENT NAME:  Long Arthur                            YOB: 1970       PREOP DIAGNOSIS:   Left ankle acute displaced comminuted unstable trimalleolar fracture subluxation.    POSTOP DIAGNOSIS:   Same.    PROCEDURE:   Left ankle open reduction and internal fixation of trimalleolar ankle fractures without direct fixation of posterior malleolus.    SURGEON:     Moisés Guerrier MD    OPERATIVE TEAM:  Circulator: Saritha Lombardo RN; Carrie Hooks RN; Sydni Schuster RN      Scrub Person: Pravin Bauer    ANESTHETIST:  CRNA: Fredy Foster CRNA; Tera Bob CRNA    ANESTHESIA:  General plus regional block    ESTIM BLOOD LOSS:   50 ml    TOURNIQUET TIME:   Not used.    FINDINGS:     Comminuted displaced unstable ankle trimalleolar fracture subluxation.    IMPLANTS:   Synthes small fragment seven hole 1/3 semitubular locking plate and hybrid cortical, cancellous and locking screws, an anterior to posterior compression screw, and medial malleolar fixation with two partial threaded compression screws.     COMPLICATIONS:    None.    DISPOSITION:    Stable to recovery.     INDICATIONS:    Unstable ankle fracture dislocation.    NARRATIVE:    Clinical exam and imaging reveal a comminuted, unstable displaced trimalleolar ankle fracture dislocation.  Recommended open reduction and fracture stabilization surgery for the unstable ankle fractures was reviewed in detail.  Risks, benefits and alternatives were discussed and informed consent for surgical procedure obtained.  Risks discussed including but not limited to anesthesia, infection, fracture malunion, nonunion, DVT, PE, post-traumatic arthritis, and persistent pain or limitations from the fracture.  Goals include the potential for earlier pain relief, improved fracture position and healing potential, improved ankle  mobility and functional outcome.  The patient presents for ankle fracture surgery.    Antibiotic prophylaxis was given.  Surgeon site marking and a time out were performed prior to the procedure.  Anesthesia was effective and well tolerated.  The leg and foot was prepped and draped in the usual sterile fashion.  A tourniquet was not used and there was good hemostasis throughout the procedure.  After sterile prep and drape a lateral and then subsequently a medial ankle incision was made to approach the fractures.  Subperiosteal dissection exposed the fractures and the soft tissues were protected.  With bone holding forceps an anatomic reduction of the distal fibula was achieved.  The distal fibula was stabilized with an anterior to posterior compression screw followed by a 1/3 semitubular locking plate with hybrid fixation optimizing use of cortical, cancellous and locking screws.  Medially, the distal tibia medial malleolar fracture was stabilized with two partially threaded compression screws.  These steps were achieved with blunt retractors, soft tissue sleeve protected drilling, depth gauge measurement and screw placement.  The posterior malleolar fracture was small, involved about ten to fifteen percent of the joint arc on the lateral view, was well positioned near anatomic and did not require direct fixation.  With C-arm fluoroscopic imaging, push pull test and other stress tests revealed syndesmosis, deltoid and ligament stability.  A secure stable fixation was achieved with full passive ankle mobility.  Final C-arm images were saved with a good reduction, good position of the hardware, and a smooth ankle joint space.    The area was irrigated copiously and suctioned clear throughout the procedure.  Routine closure performed and a sterile padded dressing and well padded short leg ankle fracture immobilizer applied.  Anesthesia was effective and well tolerated.  There were no complications of the procedure.   The patient was transferred in stable condition to recovery.

## 2018-03-01 NOTE — PLAN OF CARE
Problem: Perioperative Period (Adult)  Goal: Signs and Symptoms of Listed Potential Problems Will be Absent or Manageable (Perioperative Period)  Outcome: Ongoing (interventions implemented as appropriate)   03/01/18 1025   Perioperative Period   Problems Assessed (Perioperative Period) all   Problems Present (Perioperative Period) none

## 2018-03-01 NOTE — ANESTHESIA PROCEDURE NOTES
Peripheral Block    Start time: 3/1/2018 1:40 PM  Stop time: 3/1/2018 1:43 PM  Reason for block: at surgeon's request and post-op pain management  Performed by  CRNA: BHAVYA CAGLE  Preanesthetic Checklist  Completed: patient identified, site marked, surgical consent, pre-op evaluation, timeout performed, IV checked, risks and benefits discussed and monitors and equipment checked  Prep:  Pt Position: supine  Sterile barriers:cap, gloves, mask and sterile barriers  Prep: ChloraPrep  Patient monitoring: blood pressure monitoring, continuous pulse oximetry and EKG  Procedure  Performed under: general  Guidance:ultrasound guided  ULTRASOUND INTERPRETATION. Using ultrasound guidance a gauge needle was placed in close proximity to the femoral nerve, at which point, under ultrasound guidance anesthetic was injected in the area of the nerve and spread of the anesthesia was seen on ultrasound in close proximity thereto.  There were no abnormalities seen on ultrasound; a digital image was taken; and the patient tolerated the procedure with no complications. Images:still images obtained    Laterality:left  Block Type:adductor canal block    Needle Type:Tuohy  Needle Gauge:20 G  Resistance on Injection: none  Medications  Comment:Adjuncts per total volume of LA:    Decadron 10 mg PSF    If required, intravenous sedation was given -- see meds on anesthesia record.  Local Injected:ropivacaine 0.5% Local Amount Injected:10mL  Post Assessment  Injection Assessment: negative aspiration for heme, no paresthesia on injection and incremental injection  Patient Tolerance:comfortable throughout block  Complications:no  Additional Notes    +++++++++++++++++++++++++++++++++++++++++    Procedure:          SINGLE ADDUCTOR CANAL BLOCK                                 Patient analgesia was achieved with General Anesthesia       The pt was placed in the Supine position.  The Insertion site was  prepped and Draped in sterile fashion.   A BBraun echogenic needle was then  inserted approximately midline, mid-thigh and advanced In-plane with Ultrasound guidance. The Vastus medialis and Sartorius muscle were visualized and the needle tip was placed in the adductor canal,  lateral to the femoral artery.  LA injection spread was visualized, injection was incremental 1-5 ml; injection pressure was normal or little; no intraneural injection; no vascular injection. LA dose was injected thru the needle (see dose above).

## 2018-03-01 NOTE — ANESTHESIA POSTPROCEDURE EVALUATION
Patient: Long Arthur    Procedure Summary     Date Anesthesia Start Anesthesia Stop Room / Location    03/01/18 1210  [facility] OR 4 / [facility]       Procedure Diagnosis Surgeon Provider     ANKLE LEFT OPEN REDUCTION INTERNAL FIXATION (Left Ankle) Trimalleolar fracture of left ankle, closed, initial encounter  (Trimalleolar fracture of left ankle, closed, initial encounter [S82.765G]) MD Fredy Cabrera CRNA          Anesthesia Type: general  Last vitals  BP   142/79 (03/01/18 1545)   Temp   97.9 °F (36.6 °C) (03/01/18 1515)   Pulse   81 (03/01/18 1545)   Resp   16 (03/01/18 1545)     SpO2   96 % (03/01/18 1545)     Post Anesthesia Care and Evaluation    Patient location during evaluation: PACU  Patient participation: complete - patient participated  Level of consciousness: awake  Pain score: 0  Pain management: adequate  Airway patency: patent  Anesthetic complications: No anesthetic complications  PONV Status: controlled  Cardiovascular status: acceptable and stable  Respiratory status: acceptable and face mask  Hydration status: acceptable

## 2018-03-01 NOTE — ANESTHESIA PROCEDURE NOTES
Airway  Urgency: elective    Airway not difficult    General Information and Staff    Patient location during procedure: OR  CRNA: FERNANDO MAYEN    Indications and Patient Condition  Indications for airway management: CNS depression    Preoxygenated: yes  Mask difficulty assessment: 1 - vent by mask    Final Airway Details  Final airway type: supraglottic airway      Successful airway: classic  Size 4    Number of attempts at approach: 1

## 2018-03-03 NOTE — PROGRESS NOTES
KIRSTEN Fisher    Nerve Cath Post Op Call    Patient Name: Long Arthur  :  1970  MRN:  [id number]  Date of Discharge: 3/1/2018    Nerve Cath Post Op Call:    Catheter Plan:Patient called/No answer/Message left to call [facility] pain service for any questions or complaints

## 2018-03-05 NOTE — PROGRESS NOTES
KIRSTEN Fisher    Nerve Cath Post Op Call    Patient Name: Long Arthur  :  1970  MRN:  [id number]  Date of Discharge: 3/1/2018    Nerve Cath Post Op Call:    Analgesia:Good  Pain Score:3/10  Side Effects:None  Catheter Site:clean  Patient Controlled ON Q pump infusion rate: 8ml/hr  Catheter Plan:Patient/Family member was instructed to remove the catheter during telephone contact and Patient/Family member report nerve catheter previously discontinued, tip intact

## 2018-03-08 DIAGNOSIS — Z09 FOLLOW UP: Primary | ICD-10-CM

## 2018-03-09 ENCOUNTER — HOSPITAL ENCOUNTER (OUTPATIENT)
Dept: OTHER | Age: 48
Discharge: HOME | End: 2018-03-09
Attending: ORTHOPAEDIC SURGERY | Admitting: ORTHOPAEDIC SURGERY

## 2018-03-09 ENCOUNTER — OFFICE VISIT (OUTPATIENT)
Dept: ORTHOPEDIC SURGERY | Facility: CLINIC | Age: 48
End: 2018-03-09

## 2018-03-09 VITALS — RESPIRATION RATE: 18 BRPM | WEIGHT: 175 LBS | HEIGHT: 72 IN | BODY MASS INDEX: 23.7 KG/M2

## 2018-03-09 DIAGNOSIS — S82.852A TRIMALLEOLAR FRACTURE OF LEFT ANKLE, CLOSED, INITIAL ENCOUNTER: Primary | ICD-10-CM

## 2018-03-09 DIAGNOSIS — R52 PAIN: ICD-10-CM

## 2018-03-09 DIAGNOSIS — Z98.890 STATUS POST OPEN REDUCTION WITH INTERNAL FIXATION (ORIF) OF FRACTURE OF ANKLE: ICD-10-CM

## 2018-03-09 DIAGNOSIS — Z87.81 STATUS POST OPEN REDUCTION WITH INTERNAL FIXATION (ORIF) OF FRACTURE OF ANKLE: ICD-10-CM

## 2018-03-09 PROCEDURE — 99024 POSTOP FOLLOW-UP VISIT: CPT | Performed by: ORTHOPAEDIC SURGERY

## 2018-03-09 NOTE — PROGRESS NOTES
"Subjective   Patient ID: Long Arthur is a 48 y.o.  male is here today for a post-operative visit  Pain and Post-op of the Left Ankle and Suture / Staple Removal        History of Present Illness     Pain controlled: [] no   [x] yes   Medication refill requested: [x] no   [] yes    Patient compliant with instructions: [] no   [x] yes   Other: Reports good progress since surgery.     Past Medical History:   Diagnosis Date   • H/O exercise stress test 2016    DONE AT [facility]- \"IT WAS NORMAL\"   • Problems with swallowing     FOOD   • Wears dentures     UPPER PLATE        Past Surgical History:   Procedure Laterality Date   • ANKLE OPEN REDUCTION INTERNAL FIXATION Left 3/1/2018    Procedure:  ANKLE LEFT OPEN REDUCTION INTERNAL FIXATION;  Surgeon: Joss Guerrier MD;  Location: [facility];  Service:    • MOUTH SURGERY      SOME EXTRACTIONS       Allergies   Allergen Reactions   • Penicillins Hives and Anaphylaxis     Pt states he has had this allergy since childhood       Review of Systems   Constitutional: Negative for fever.   HENT: Negative for voice change.    Eyes: Negative for visual disturbance.   Respiratory: Negative for shortness of breath.    Cardiovascular: Negative for chest pain.   Gastrointestinal: Negative for abdominal distention and abdominal pain.   Genitourinary: Negative for dysuria.   Musculoskeletal: Positive for arthralgias. Negative for gait problem and joint swelling.   Skin: Negative for rash.   Neurological: Negative for speech difficulty.   Hematological: Does not bruise/bleed easily.   Psychiatric/Behavioral: Negative for confusion.       Objective   Resp 18  Ht 182.9 cm (72\")  Wt 79.4 kg (175 lb)  BMI 23.73 kg/m2      Signs of infection: [x] no                    [] yes   Drainage: [x] no                    [] yes   Incision: [x] healing well     []healed well   Motor exam intact: [] no                    [x] yes   Neurovascular exam intact: [] no                    [x] yes "   Signs of compartment syndrome: [x] no                    [] yes   Signs of DVT: [x] no                    [] yes   Other:      Physical Exam   Constitutional: He appears well-developed. No distress.   Neurological: He is alert.   Skin: Skin is warm and dry. No rash noted. No erythema.   Psychiatric: He has a normal mood and affect. His speech is normal and behavior is normal. Judgment and thought content normal.   Vitals reviewed.    Left Ankle Exam   Swelling: moderate    Tenderness   Left ankle tenderness location: diffuse soreness though pain well controlled typically 2/10.     Range of Motion   Dorsiflexion: 5   Plantar flexion: 30     Muscle Strength   Dorsiflexion:  4/5   Plantar flexion:  5/5     Tests   Anterior drawer: negative  Varus tilt: negative    Other   Erythema: absent  Scars: present (small medial and longer lateral ankle incisions clean, dry, intact.  Diffuse ecchymosis.  No fracture blisters.)  Left ankle sensation: grossly intact to foot and all toes.  Pulse: present (and brisk cap refill to all toes.)        Extremity DVT signs are Negative on physical exam with negative Justo sign, with no calf pain and with no palpable cords  Neurologic Exam     Mental Status   Attention: normal.   Speech: speech is normal   Level of consciousness: alert  Knowledge: good.     Motor Exam   Overall muscle tone: normal    Gait, Coordination, and Reflexes     Gait  Gait: (stable walker assist toe touch only WB in ankle fracture boot)    Ortho Exam    Assessment/Plan   Independent Review of Radiographic Studies:    Indication to evaluate fracture healing, and compared with prior imaging, shows interim fracture healing with good maintained reduction and alignment and intact position of fracture fixation hardware.  Laboratory and Other Studies:  No new results reviewed today.  Recent results were stable and reviewed with patient.   Medical Decision Making:    No complications of procedure and treatments.  Stable  neurovascular and fracture follow-up exam.  Stable post-operative exam and expected early progress.  Good progress, significantly improved.  Continue current plan, and management.     Procedures  [x] No procedures were performed in office today.     Long was seen today for suture / staple removal, pain and post-op.    Diagnoses and all orders for this visit:    Trimalleolar fracture of left ankle, closed, initial encounter    Status post open reduction with internal fixation (ORIF) of fracture of ankle         Recommendations/Plan:     Staples [] Removed today  [] At prior visit  [x] Plan removal later   Splint/cast applied: [x]no []SAC []LAC []SLC []LLC []PTB []Splint:    Brace: []not provided        [x]pt provided with ankle support brace previously.   Physical therapy: []not at this time    []home-based    [x]outpatient referral planned   Ultrasound: [x]not ordered         []order given to patient   Labs: [x]not ordered         []order given to patient   Weight Bearing status: []Full []WBAT []PWB [x] NWB left leg   Work/Activity status: []Regular []Medium []Light []Sedentary [x]No use extr   Prescriptions: [x]None given today  []As Noted   Imaging at next appt: [x]Yes  []No   X-ray left ankle   Additional instructions: Patient agreeable to return sooner for any new concern.     Regular exercise as tolerated  Orthopedic activities reviewed and patient expressed appreciation  Discussion of orthopedic goals  Work status form completed and provided to patient  Reduced physical activity as appropriate  Weight bearing parameters reviewed  Ice, heat, and/or modalities as beneficial  Guided on proper techniques for mobility, strength, agility and/or conditioning exercises  Temporary disablity and insurance forms also completed for patient, faxed to his work earlier this week, and copies provided to patient today.      Exercise, medications, injections, other patient advice, and return appointment as noted.  Brace: No  brace was given at today's visit  Referral: No referrals made at today's visit  Test/Studies: No additional studies ordered.  Surgery: No surgery proposed at this visit.  Work/Activity Status: May perform usual activities as tolerated, No strenuous activity. and No use of involved extremity  Patient is encouraged to call or return for any issues or concerns.    Return in about 1 week (around 3/16/2018) for Staples out, Plan outpatient PT/OT, Recheck.  Patient agreeable to call or return sooner for any concerns.

## 2018-03-16 ENCOUNTER — OFFICE VISIT (OUTPATIENT)
Dept: ORTHOPEDIC SURGERY | Facility: CLINIC | Age: 48
End: 2018-03-16

## 2018-03-16 VITALS — WEIGHT: 175 LBS | RESPIRATION RATE: 18 BRPM | BODY MASS INDEX: 23.7 KG/M2 | HEIGHT: 72 IN

## 2018-03-16 DIAGNOSIS — S82.852A CLOSED DISPLACED TRIMALLEOLAR FRACTURE OF LEFT ANKLE, INITIAL ENCOUNTER: Primary | ICD-10-CM

## 2018-03-16 PROCEDURE — 99024 POSTOP FOLLOW-UP VISIT: CPT | Performed by: PHYSICIAN ASSISTANT

## 2018-03-16 NOTE — PROGRESS NOTES
"Subjective   Patient ID: Long Arthur is a 48 y.o. right hand dominant male Post-op and Pain of the Left Ankle and Suture / Staple Removal         History of Present Illness    Patient presents for suture/staple removal in regards to left ankle trimalleolar fracture.  Patient was seen by Dr. Guerrier last week but it was too soon to remove his staples.  Patient denies fever or chills.  Denies drainage from incision sites.  Denies chest pain or shortness of breath.  Denies calf pain or swelling.  Pain Score: 1  Pain Location: Ankle  Pain Orientation: Left     Pain Descriptors: Sore, Aching  Pain Frequency: Intermittent           Aggravating Factors: Standing, Walking        Pain Intervention(s): Rest, Cold applied          Past Medical History:   Diagnosis Date   • H/O exercise stress test 2016    DONE AT [facility]- \"IT WAS NORMAL\"   • Problems with swallowing     FOOD   • Wears dentures     UPPER PLATE        Past Surgical History:   Procedure Laterality Date   • ANKLE OPEN REDUCTION INTERNAL FIXATION Left 3/1/2018    Procedure:  ANKLE LEFT OPEN REDUCTION INTERNAL FIXATION;  Surgeon: Joss Guerrier MD;  Location: [facility];  Service:    • MOUTH SURGERY      SOME EXTRACTIONS       Family History   Problem Relation Age of Onset   • Family history unknown: Yes       Social History     Social History   • Marital status:      Spouse name: N/A   • Number of children: N/A   • Years of education: N/A     Occupational History   • [employer]     Social History Main Topics   • Smoking status: Former Smoker     Packs/day: 2.00     Years: 25.00     Types: Cigarettes     Quit date: 2/28/2013   • Smokeless tobacco: Never Used   • Alcohol use Yes      Comment: social   • Drug use: No   • Sexual activity: Defer     Other Topics Concern   • Not on file     Social History Narrative   • No narrative on file       Allergies   Allergen Reactions   • Penicillins Hives and Anaphylaxis     Pt " "states he has had this allergy since childhood       Review of Systems   Constitutional: Negative for fever.   HENT: Negative for voice change.    Eyes: Negative for visual disturbance.   Respiratory: Negative for shortness of breath.    Cardiovascular: Negative for chest pain.   Gastrointestinal: Negative for abdominal distention and abdominal pain.   Genitourinary: Negative for dysuria.   Musculoskeletal: Positive for arthralgias. Negative for gait problem and joint swelling.   Skin: Negative for rash.   Neurological: Negative for speech difficulty.   Hematological: Does not bruise/bleed easily.   Psychiatric/Behavioral: Negative for confusion.       Objective   Resp 18   Ht 182.9 cm (72\")   Wt 79.4 kg (175 lb)   BMI 23.73 kg/m²    Physical Exam   Constitutional: He appears well-nourished.   Musculoskeletal:        Left knee: He exhibits no swelling. No tenderness found. No medial joint line tenderness noted.        Left ankle: He exhibits swelling. He exhibits no ecchymosis. Tenderness. Lateral malleolus and medial malleolus tenderness found.        Left foot: There is no tenderness, no bony tenderness and no swelling.   Skin: Capillary refill takes less than 2 seconds.   Psychiatric: He has a normal mood and affect. His behavior is normal.   Vitals reviewed.    Ortho Exam   Extremity DVT signs are Negative on physical exam with negative Justo sign, with no calf pain, with no palpable cords, with no increased pain with passive stretch/extension and with no skin tone change   Neurologic Exam   Left Ankle Exam     Tenderness   The patient is experiencing tenderness in the medial malleolus.    Left Knee Exam     Tenderness   The patient is experiencing tenderness in the no medial joint line.           Steri strips were applied to cleansed skin.   Staples were removed at today's office visit.    Assessment/Plan   Independent Review of Radiographic Studies:    No new imaging done today.      Procedures       Long " was seen today for suture / staple removal, post-op and pain.    Diagnoses and all orders for this visit:    Closed displaced trimalleolar fracture of left ankle, initial encounter  -     Ambulatory Referral to Physical Therapy Evaluate and treat, Ortho       Orthopedic activities reviewed and patient expressed appreciation  Discussion of orthopedic goals  Risk, benefits, and merits of treatment alternatives reviewed with the patient and questions answered  Reduced physical activity as appropriate  Weight bearing parameters reviewed  Avoid offending activity  Ice, heat, and/or modalities as beneficial  Watch for signs and symptoms of infection    Recommendations/Plan:  Patient is encouraged to call or return for any issues or concerns.    FU in 4 weeks    Patient agreeable to call or return sooner for any concerns.

## 2018-04-12 DIAGNOSIS — Z09 SURGICAL FOLLOWUP: Primary | ICD-10-CM

## 2018-04-13 ENCOUNTER — OFFICE VISIT (OUTPATIENT)
Dept: ORTHOPEDIC SURGERY | Facility: CLINIC | Age: 48
End: 2018-04-13

## 2018-04-13 ENCOUNTER — HOSPITAL ENCOUNTER (OUTPATIENT)
Dept: OTHER | Age: 48
Discharge: HOME | End: 2018-04-13
Attending: ORTHOPAEDIC SURGERY | Admitting: ORTHOPAEDIC SURGERY

## 2018-04-13 VITALS — WEIGHT: 195 LBS | HEIGHT: 70 IN | BODY MASS INDEX: 27.92 KG/M2 | RESPIRATION RATE: 18 BRPM

## 2018-04-13 DIAGNOSIS — Z09 SURGICAL FOLLOWUP: ICD-10-CM

## 2018-04-13 DIAGNOSIS — Z09 SURGERY FOLLOW-UP: ICD-10-CM

## 2018-04-13 DIAGNOSIS — S82.852A CLOSED DISPLACED TRIMALLEOLAR FRACTURE OF LEFT ANKLE, INITIAL ENCOUNTER: Primary | ICD-10-CM

## 2018-04-13 DIAGNOSIS — Z87.81 STATUS POST OPEN REDUCTION WITH INTERNAL FIXATION (ORIF) OF FRACTURE OF ANKLE: ICD-10-CM

## 2018-04-13 DIAGNOSIS — Z98.890 STATUS POST OPEN REDUCTION WITH INTERNAL FIXATION (ORIF) OF FRACTURE OF ANKLE: ICD-10-CM

## 2018-04-13 PROCEDURE — 99024 POSTOP FOLLOW-UP VISIT: CPT | Performed by: ORTHOPAEDIC SURGERY

## 2018-04-13 RX ORDER — CEPHALEXIN 500 MG/1
500 CAPSULE ORAL 3 TIMES DAILY
Qty: 30 CAPSULE | Refills: 0 | Status: SHIPPED | OUTPATIENT
Start: 2018-04-13

## 2018-04-13 NOTE — PROGRESS NOTES
"Subjective   Patient ID: Long Arthur is a 48 y.o.  male is here today for a post-operative visit  Post-op of the Left Ankle        History of Present Illness     Pain controlled: [] no   [x] yes, no pain reported.   Medication refill requested: [x] no   [] yes    Patient compliant with instructions: [] no   [x] yes   Other: Reports good progress since surgery.     Past Medical History:   Diagnosis Date   • H/O exercise stress test 2016    DONE AT [facility]- \"IT WAS NORMAL\"   • Problems with swallowing     FOOD   • Wears dentures     UPPER PLATE        Past Surgical History:   Procedure Laterality Date   • ANKLE OPEN REDUCTION INTERNAL FIXATION Left 3/1/2018    Procedure:  ANKLE LEFT OPEN REDUCTION INTERNAL FIXATION;  Surgeon: Joss Guerrier MD;  Location: [facility];  Service:    • MOUTH SURGERY      SOME EXTRACTIONS       Allergies   Allergen Reactions   • Penicillins Hives and Anaphylaxis     Pt states he has had this allergy since childhood       Review of Systems   Constitutional: Negative for fever.   Skin:        Mild cellulitis with no streaks laterally.  Dry thin scab of lateral incision.  Medial incision scar pristine.   All other systems reviewed and are negative.      Objective   Resp 18   Ht 177.8 cm (70\")   Wt 88.5 kg (195 lb)   BMI 27.98 kg/m²       Signs of infection: [x] no                    [] yes   Drainage: [x] no                    [] yes   Incision: [x] healing well     []healed well   Motor exam intact: [] no                    [x] yes   Neurovascular exam intact: [] no                    [x] yes   Signs of compartment syndrome: [x] no                    [] yes   Signs of DVT: [x] no                    [] yes   Other:      Physical Exam   Constitutional: He appears well-developed. No distress.   Musculoskeletal: He exhibits no deformity.   Neurological: He is alert.   Skin: Skin is warm and dry. No rash noted. No erythema.   Psychiatric: He has a normal mood and affect.   Vitals " reviewed.    Ortho Exam   Extremity DVT signs are Negative on physical exam with negative Justo sign, with no calf pain and with no palpable cords  Neurologic Exam     Gait, Coordination, and Reflexes     Gait  Gait: (stable crutch assist ambulation in normal sneaker.)    Ortho Exam    Assessment/Plan   Independent Review of Radiographic Studies:    Indication to evaluate fracture healing, and compared with prior imaging, shows interim fracture healing with good maintained reduction and alignment and intact position of fracture fixation hardware.  Laboratory and Other Studies:  No new results reviewed today.   Medical Decision Making:    Stable neurovascular exam.  Good progress, significantly improved.  Continue current plan, and management.  Sequelae of mild cellulitis of lateral side, improving and stable.  Antibiotic prophylaxis advised and given, patient has tolerated Keflex well in the past.     Procedures  [x] No procedures were performed in office today.     Long was seen today for post-op.    Diagnoses and all orders for this visit:    Closed displaced trimalleolar fracture of left ankle, initial encounter    Status post open reduction with internal fixation (ORIF) of fracture of ankle    Surgical followup    Other orders  -     cephalexin (KEFLEX) 500 MG capsule; Take 1 capsule by mouth 3 (Three) Times a Day.         Recommendations/Plan:     Sutures Staples or Pins [] Removed today  [x] At prior visit  [] Plan removal later   Splint/cast applied: [x]no []SAC []LAC []SLC []LLC []PTB []Splint:    Brace: [x]not provided        []pt provided with:   Physical therapy: []not at this time    [x]home-based    []outpatient referral   Ultrasound: [x]not ordered         []order given to patient   Labs: [x]not ordered         []order given to patient   Weight Bearing status: []Full [x]WBAT []PWB []NWB []Other   Work/Activity status: []Regular []Medium []Light on 4-23-18  []No use extr   Prescriptions: []None given  today  [x]As Noted   Imaging at next appt: [x]Yes  []No        As noted   Additional instructions: Patient agreeable to return sooner for any new concern.     Regular exercise as tolerated  Orthopedic activities reviewed and patient expressed appreciation  Discussion of orthopedic goals  Take prescribed medications as instructed only as tolerated  Work status form completed and provided to patient  Reduced physical activity as appropriate  Watch for signs and symptoms of infection  Wound care instructions given  Guided on proper techniques for mobility, strength, agility and/or conditioning exercises      Exercise, medications, injections, other patient advice, and return appointment as noted.  Brace: No brace was given at today's visit  Referral: No referrals made at today's visit  Test/Studies: No additional studies ordered.  Surgery: No surgery proposed at this visit.  Work/Activity Status: Light duty and as tolerated starting 4-23-18.  Patient is encouraged to call or return for any issues or concerns.    Return in about 1 month (around 5/13/2018) for Xray left ankle on arrival, Recheck.  Patient agreeable to call or return sooner for any concerns.

## 2020-07-30 ENCOUNTER — APPOINTMENT (OUTPATIENT)
Dept: CT IMAGING | Facility: HOSPITAL | Age: 50
End: 2020-07-30
Payer: COMMERCIAL

## 2020-07-30 ENCOUNTER — HOSPITAL ENCOUNTER (EMERGENCY)
Facility: HOSPITAL | Age: 50
Discharge: HOME | End: 2020-07-30
Attending: FAMILY MEDICINE
Payer: COMMERCIAL

## 2020-07-30 VITALS
BODY MASS INDEX: 25.48 KG/M2 | HEART RATE: 75 BPM | RESPIRATION RATE: 18 BRPM | DIASTOLIC BLOOD PRESSURE: 75 MMHG | HEIGHT: 71 IN | TEMPERATURE: 97.9 F | WEIGHT: 182 LBS | SYSTOLIC BLOOD PRESSURE: 134 MMHG | OXYGEN SATURATION: 98 %

## 2020-07-30 LAB
ANION GAP SERPL CALCULATED.3IONS-SCNC: 7 MMOL/L (ref 3–16)
BASOPHILS ABSOLUTE: 0.1 K/UL (ref 0–0.1)
BASOPHILS RELATIVE PERCENT: 0.8 %
BUN BLDV-MCNC: 17 MG/DL (ref 6–20)
CALCIUM SERPL-MCNC: 9.7 MG/DL (ref 8.5–10.5)
CHLORIDE BLD-SCNC: 100 MMOL/L (ref 98–107)
CO2: 28 MMOL/L (ref 20–30)
CREAT SERPL-MCNC: 0.7 MG/DL (ref 0.4–1.2)
EOSINOPHILS ABSOLUTE: 0.4 K/UL (ref 0–0.4)
EOSINOPHILS RELATIVE PERCENT: 6.6 %
GFR AFRICAN AMERICAN: >59
GFR NON-AFRICAN AMERICAN: >59
GLUCOSE BLD-MCNC: 126 MG/DL (ref 74–106)
HCT VFR BLD CALC: 45.7 % (ref 40–54)
HEMOGLOBIN: 15.3 G/DL (ref 13–18)
IMMATURE GRANULOCYTES #: 0 K/UL
IMMATURE GRANULOCYTES %: 0.3 % (ref 0–5)
LYMPHOCYTES ABSOLUTE: 1.3 K/UL (ref 1.5–4)
LYMPHOCYTES RELATIVE PERCENT: 21.6 %
MCH RBC QN AUTO: 30.9 PG (ref 27–32)
MCHC RBC AUTO-ENTMCNC: 33.5 G/DL (ref 31–35)
MCV RBC AUTO: 92.3 FL (ref 80–100)
MONOCYTES ABSOLUTE: 0.7 K/UL (ref 0.2–0.8)
MONOCYTES RELATIVE PERCENT: 10.9 %
NEUTROPHILS ABSOLUTE: 3.7 K/UL (ref 2–7.5)
NEUTROPHILS RELATIVE PERCENT: 59.8 %
PDW BLD-RTO: 12.4 % (ref 11–16)
PLATELET # BLD: 267 K/UL (ref 150–400)
PMV BLD AUTO: 10 FL (ref 6–10)
POTASSIUM SERPL-SCNC: 4.1 MMOL/L (ref 3.4–5.1)
RBC # BLD: 4.95 M/UL (ref 4.5–6)
SODIUM BLD-SCNC: 135 MMOL/L (ref 136–145)
TSH REFLEX: 1.18 UIU/ML (ref 0.35–5.5)
WBC # BLD: 6.2 K/UL (ref 4–11)

## 2020-07-30 PROCEDURE — 36415 COLL VENOUS BLD VENIPUNCTURE: CPT

## 2020-07-30 PROCEDURE — 99284 EMERGENCY DEPT VISIT MOD MDM: CPT

## 2020-07-30 PROCEDURE — 85025 COMPLETE CBC W/AUTO DIFF WBC: CPT

## 2020-07-30 PROCEDURE — 70450 CT HEAD/BRAIN W/O DYE: CPT

## 2020-07-30 PROCEDURE — 84443 ASSAY THYROID STIM HORMONE: CPT

## 2020-07-30 PROCEDURE — 80048 BASIC METABOLIC PNL TOTAL CA: CPT

## 2020-07-30 RX ORDER — MECLIZINE HYDROCHLORIDE 25 MG/1
25 TABLET ORAL 3 TIMES DAILY PRN
Qty: 30 TABLET | Refills: 0 | Status: SHIPPED | OUTPATIENT
Start: 2020-07-30 | End: 2020-08-09

## 2020-07-30 ASSESSMENT — ENCOUNTER SYMPTOMS
SINUS PRESSURE: 0
VOMITING: 0
DIARRHEA: 0
NAUSEA: 1
SINUS PAIN: 0

## 2020-07-30 NOTE — ED PROVIDER NOTES
[address]  eMERGENCY dEPARTMENT eNCOUnter      Pt Name: Alfred Bustillo  MRN: [id number]  [place] 1970  Date of evaluation: 7/30/2020  Provider: Patrice Albarado MD    [address] [place] [address]       Chief Complaint   Patient presents with    Dizziness         HISTORY OF PRESENT ILLNESS   (Location/Symptom, Timing/Onset, Context/Setting, Quality, Duration, Modifying Factors, Severity)  Note limiting factors. Alfred Bustillo is a 48 y.o. male who presents to the emergency department with a two-week history of intermittent episodes of dizziness which at times are violent in nature. He states they come on suddenly. They are not really related to any activity he can think of. Some nausea but no vomiting. He denies any difficulty with his hearing. Nursing Notes were reviewed. REVIEW OF SYSTEMS    (2-9 systems for level 4, 10 or more forlevel 5)     Review of Systems   Constitutional: Negative for fever. HENT: Negative for ear pain, hearing loss, sinus pressure and sinus pain. Eyes: Negative for visual disturbance. Gastrointestinal: Positive for nausea. Negative for diarrhea and vomiting. Neurological: Positive for dizziness. Negative for seizures, syncope, speech difficulty, numbness and headaches. Except as noted above the remainder of the review of systems was reviewed and negative. PAST MEDICAL HISTORY   History reviewed. No pertinent past medical history. SURGICAL HISTORY       Past Surgical History:   Procedure Laterality Date    FRACTURE SURGERY           CURRENT MEDICATIONS       Previous Medications    No medications on file       ALLERGIES     Penicillins    FAMILY HISTORY     History reviewed. No pertinent family history.        SOCIAL HISTORY       Social History     Socioeconomic History    Marital status:      Spouse name: None    Number of children: None    Years of education: None    Highest education level: None   Occupational History    normal.      Breath sounds: Normal breath sounds. Abdominal:      General: Bowel sounds are normal.      Tenderness: There is no abdominal tenderness. Neurological:      General: No focal deficit present. Mental Status: He is alert and oriented to person, place, and time. Coordination: Coordination normal.      Gait: Gait normal.         DIAGNOSTIC RESULTS     EKG: All EKG's are interpreted by the Emergency Department Physician who either signs or Co-signs this chart in the absence of a cardiologist.    EKG shows normal sinus rhythm with a rate of 69 bpm.  There is no ectopy. Intervals are normal.    RADIOLOGY:   Non-plain film images such as CT, Ultrasound and MRI are read by the radiologist.  Mattock images are visualized and preliminarily interpreted by the emergency physician with the below findings:        Interpretation per the Radiologist below, if available at the time of this note:    CT HEAD WO CONTRAST   Final Result      Unremarkable CT head without IV.  In particular, no acute intracranial hemorrhage or signs of mass effect            ED BEDSIDE ULTRASOUND:   Performed by ED Physician - none    LABS:  Labs Reviewed   CBC WITH AUTO DIFFERENTIAL - Abnormal; Notable for the following components:       Result Value    Lymphocytes Absolute 1.3 (*)     All other components within normal limits    Narrative:     Performed at:  [address] [place] [address] Laboratory  [address] [place] [address], [place] [address]   Phone [phone]   BASIC METABOLIC PANEL - Abnormal; Notable for the following components:    Sodium 135 (*)     Glucose 126 (*)     All other components within normal limits    Narrative:     Performed at:  [address] [place] [address] Laboratory  [address] [place] [address], [place] [address]   Phone [phone]   TSH WITH REFLEX    Narrative:     Performed at:  [address] [place] [address] Laboratory  [address] [place] [address], [place], [place] [address]   Phone [phone]       All other labs were within normal range or not returned as of this dictation. EMERGENCY DEPARTMENT COURSE and DIFFERENTIALDIAGNOSIS/MDM:   Vitals:    Vitals:    07/30/20 1806 07/30/20 1814 07/30/20 1815 07/30/20 1830   BP: 130/75 124/74 124/74 125/74   Pulse: 80 77 82 82   Resp:       Temp:       TempSrc:       SpO2: 96% 96% 96% 97%   Weight:       Height:               CRITICALCARE TIME   Total Critical Care time was 0 minutes, excludingseparately reportable procedures. There was a high probabilityof clinically significant/life threatening deterioration in the patient's condition which required my urgent intervention. CONSULTS:  None    PROCEDURES:  None    FINAL IMPRESSION      1.  Dizziness        DISPOSITION/PLAN   DISPOSITION Decision To Discharge 07/30/2020 06:43:01 PM      PATIENT REFERRED TO:  Salvatroe Niño  [address] [place] [address]  [phone]    Schedule an appointment as soon as possible for a visit       Darryle Pinion, MD  [address] [place] [id number] [phone]            DISCHARGE MEDICATIONS:  New Prescriptions    MECLIZINE (ANTIVERT) 25 MG TABLET    Take 1 tablet by mouth 3 times daily as needed for Dizziness       (Please note that portions ofthis note were completed with a voice recognition program.  Efforts were made to edit the dictations but occasionally words are mis-transcribed.)    Clarence Bustillo MD(electronically signed)  Attending Emergency Physician          Clarence Bustillo MD  07/30/20 2762

## 2020-07-30 NOTE — ED TRIAGE NOTES
Pt c/o having dizziness x 2 weeks on and off. He is not dizzy at this time.   He advises me that the dizziness was bad ion Monday and tuesday

## 2020-07-30 NOTE — ED NOTES
Reviewed discharge plan with Marvin Romero. Encouraged him to f/u with [place] and he understood. NAD noted on discharge, gait steady. Reviewed discharge prescription for:     Current Discharge Medication List      START taking these medications    Details   meclizine (ANTIVERT) 25 MG tablet Take 1 tablet by mouth 3 times daily as needed for Dizziness  Qty: 30 tablet, Refills: 0             Perez states understanding of how and when to take medications.       Electronically signed by Ayden Lomax RN on 7/30/2020 at 6:53 PM     Ayden Lomax RN  07/30/20 2703

## 2021-07-19 ENCOUNTER — HOSPITAL ENCOUNTER (EMERGENCY)
Facility: HOSPITAL | Age: 51
Discharge: HOME | End: 2021-07-19
Attending: HOSPITALIST
Payer: COMMERCIAL

## 2021-07-19 ENCOUNTER — APPOINTMENT (OUTPATIENT)
Dept: GENERAL RADIOLOGY | Facility: HOSPITAL | Age: 51
End: 2021-07-19
Payer: COMMERCIAL

## 2021-07-19 VITALS
SYSTOLIC BLOOD PRESSURE: 141 MMHG | OXYGEN SATURATION: 99 % | DIASTOLIC BLOOD PRESSURE: 86 MMHG | BODY MASS INDEX: 25.62 KG/M2 | HEIGHT: 71 IN | WEIGHT: 183 LBS | HEART RATE: 73 BPM | RESPIRATION RATE: 16 BRPM | TEMPERATURE: 97.8 F

## 2021-07-19 DIAGNOSIS — R42 DIZZINESS: ICD-10-CM

## 2021-07-19 DIAGNOSIS — R07.9 CHEST PAIN, UNSPECIFIED TYPE: Primary | ICD-10-CM

## 2021-07-19 LAB
A/G RATIO: 1.6 (ref 0.8–2)
ALBUMIN SERPL-MCNC: 4.7 G/DL (ref 3.4–4.8)
ALP BLD-CCNC: 98 U/L (ref 25–100)
ALT SERPL-CCNC: 33 U/L (ref 4–36)
ANION GAP SERPL CALCULATED.3IONS-SCNC: 11 MMOL/L (ref 3–16)
AST SERPL-CCNC: 24 U/L (ref 8–33)
BASOPHILS ABSOLUTE: 0.1 K/UL (ref 0–0.1)
BASOPHILS RELATIVE PERCENT: 1.1 %
BILIRUB SERPL-MCNC: 0.8 MG/DL (ref 0.3–1.2)
BUN BLDV-MCNC: 22 MG/DL (ref 6–20)
CALCIUM SERPL-MCNC: 9.4 MG/DL (ref 8.5–10.5)
CHLORIDE BLD-SCNC: 101 MMOL/L (ref 98–107)
CO2: 26 MMOL/L (ref 20–30)
CREAT SERPL-MCNC: 0.9 MG/DL (ref 0.4–1.2)
EOSINOPHILS ABSOLUTE: 0.3 K/UL (ref 0–0.4)
EOSINOPHILS RELATIVE PERCENT: 4.9 %
GFR AFRICAN AMERICAN: >59
GFR NON-AFRICAN AMERICAN: >59
GLOBULIN: 3 G/DL
GLUCOSE BLD-MCNC: 123 MG/DL (ref 74–106)
HCT VFR BLD CALC: 48.1 % (ref 40–54)
HEMOGLOBIN: 15.5 G/DL (ref 13–18)
IMMATURE GRANULOCYTES #: 0 K/UL
IMMATURE GRANULOCYTES %: 0.4 % (ref 0–5)
LYMPHOCYTES ABSOLUTE: 1.4 K/UL (ref 1.5–4)
LYMPHOCYTES RELATIVE PERCENT: 27.4 %
MCH RBC QN AUTO: 29.9 PG (ref 27–32)
MCHC RBC AUTO-ENTMCNC: 32.2 G/DL (ref 31–35)
MCV RBC AUTO: 92.9 FL (ref 80–100)
MONOCYTES ABSOLUTE: 0.5 K/UL (ref 0.2–0.8)
MONOCYTES RELATIVE PERCENT: 8.6 %
NEUTROPHILS ABSOLUTE: 3 K/UL (ref 2–7.5)
NEUTROPHILS RELATIVE PERCENT: 57.6 %
PDW BLD-RTO: 12.3 % (ref 11–16)
PLATELET # BLD: 299 K/UL (ref 150–400)
PMV BLD AUTO: 10.2 FL (ref 6–10)
POTASSIUM REFLEX MAGNESIUM: 3.8 MMOL/L (ref 3.4–5.1)
RBC # BLD: 5.18 M/UL (ref 4.5–6)
SODIUM BLD-SCNC: 138 MMOL/L (ref 136–145)
TOTAL PROTEIN: 7.7 G/DL (ref 6.4–8.3)
TROPONIN: <0.3 NG/ML
WBC # BLD: 5.3 K/UL (ref 4–11)

## 2021-07-19 PROCEDURE — 85025 COMPLETE CBC W/AUTO DIFF WBC: CPT

## 2021-07-19 PROCEDURE — 99282 EMERGENCY DEPT VISIT SF MDM: CPT

## 2021-07-19 PROCEDURE — 93005 ELECTROCARDIOGRAM TRACING: CPT

## 2021-07-19 PROCEDURE — 71045 X-RAY EXAM CHEST 1 VIEW: CPT

## 2021-07-19 PROCEDURE — 84484 ASSAY OF TROPONIN QUANT: CPT

## 2021-07-19 PROCEDURE — 2580000003 HC RX 258: Performed by: HOSPITALIST

## 2021-07-19 PROCEDURE — 80053 COMPREHEN METABOLIC PANEL: CPT

## 2021-07-19 RX ORDER — 0.9 % SODIUM CHLORIDE 0.9 %
1000 INTRAVENOUS SOLUTION INTRAVENOUS ONCE
Status: COMPLETED | OUTPATIENT
Start: 2021-07-19 | End: 2021-07-19

## 2021-07-19 RX ADMIN — SODIUM CHLORIDE 1000 ML: 9 INJECTION, SOLUTION INTRAVENOUS at 17:02

## 2021-07-19 ASSESSMENT — HEART SCORE: ECG: 0

## 2021-07-19 NOTE — ED TRIAGE NOTES
Patient c/o dizziness since Saturday and \"strange feeling\" in chest Saturday night. Patient states he has been having off and on intermittent chest pain since Saturday. States his wife and children have been begging him to come get checked out. Denies current chest pain at this time. Patient states he does not have a cardiologist and has never been diagnosed with any heart problems. Denies family issues of heart problems at or under age 48.

## 2021-07-19 NOTE — ED PROVIDER NOTES
[facility]      Pt Name: Lucy Ware  MRN: [id number]  YOB: 1970  Date of evaluation: 7/19/2021  Provider: Francisca Irizarry, [facility]       Chief Complaint   Patient presents with    Chest Pain    Dizziness         HISTORY OF PRESENT ILLNESS  (Location/Symptom, Timing/Onset, Context/Setting, Quality, Duration, Modifying Factors, Severity.)   Lucy Ware is a 46 y.o. male who presents to the emergency department for chest pain and dizziness. Patient states he actually had the dizziness 2 days ago. States he had been out riding a [id number] [place] and had been drinking some blood lites. Patient states that he noticed that when he would stop and get out of vehicle and walk a little bit he would get a little lightheaded and dizzy however those symptoms have resolved since that time. Patient states that evening when he laid down and went to bed he had a little chest discomfort called more of a dull achy sensation. Patient states he felt it was more to the left side of his chest.  States that he had that sensation feeling again the following morning when he woke up. Patient states that both those episodes the worst probably only lasted about 15 to 20minutes. Went away on its own. Patient denied any shortness of breath with the symptoms. Denied any radiation of the discomfort. Denied any nausea or vomiting with it. Denied any diaphoresis. Patient states he has not had any of these symptoms since that time no chest pain yesterday. Patient states that his family's been after him because of the symptoms just to go ahead and get checked out. Patient decided come to emergency department day for evaluation. Denies any symptoms at this time. Denies any headache. Denies any visual changes. Denies any numbness tingling weakness of the extremities out of ordinary. Nursing notes were reviewed.     REVIEW OFSYSTEMS    (2-9 systems for level 4, 10 or more for level 5)   ROS:  General:  No fevers, no chills, no weakness  Cardiovascular:  +chest pain, no palpitations, +dizzy  Respiratory:  No shortness of breath, no cough, no wheezing  Gastrointestinal:  No pain, no nausea, no vomiting, no diarrhea  Musculoskeletal:  No muscle pain, no joint pain  Skin:  No rash, no easy bruising  Neurologic:  No speech problems, no headache, no extremity weakness  Psychiatric:  No anxiety  Genitourinary:  No dysuria, no hematuria    Except as noted above the remainder of the review of systems was reviewed and negative. PAST MEDICAL HISTORY   History reviewed. No pertinent past medical history. SURGICAL HISTORY       Past Surgical History:   Procedure Laterality Date    FRACTURE SURGERY           CURRENT MEDICATIONS       Previous Medications    No medications on file       ALLERGIES     Penicillins    FAMILY HISTORY     History reviewed. No pertinent family history. SOCIAL HISTORY       Social History     Socioeconomic History    Marital status:      Spouse name: None    Number of children: None    Years of education: None    Highest education level: None   Occupational History    None   Tobacco Use    Smoking status: Former Smoker     Quit date: 2013     Years since quittin.4    Smokeless tobacco: Never Used   Substance and Sexual Activity    Alcohol use: Yes     Comment: occasional; weekends    Drug use: Never    Sexual activity: None   Other Topics Concern    None   Social History Narrative    None     Social Determinants of Health     Financial Resource Strain:     Difficulty of Paying Living Expenses:    Food Insecurity:     Worried About Running Out of Food in the Last Year:     Ran Out of Food in the Last Year:    Transportation Needs:     Lack of Transportation (Medical):      Lack of Transportation (Non-Medical):    Physical Activity:     Days of Exercise per Week:     Minutes of Exercise per Session:    Stress:     Feeling of Stress :    Social Connections:     Frequency of Communication with Friends and Family:     Frequency of Social Gatherings with Friends and Family:     Attends Scientologist Services:     Active Member of Clubs or Organizations:     Attends Club or Organization Meetings:     Marital Status:    Intimate Partner Violence:     Fear of Current or Ex-Partner:     Emotionally Abused:     Physically Abused:     Sexually Abused:          PHYSICAL EXAM    (up to 7 for level 4, 8 or more for level 5)     ED Triage Vitals   BP Temp Temp src Pulse Resp SpO2 Height Weight   -- -- -- -- -- -- -- --       Physical Exam  General :Patient is awake, alert, oriented, in no acute distress, nontoxic appearing  HEENT: Pupils are equally round and reactive to light, EOMI, conjunctivae clear. Oral mucosa is moist, no exudate. Uvula is midline  Cardiac: Heart regular rate, rhythm, no murmurs, rubs, or gallops  Lungs: Lungs are clear to auscultation, there is no wheezing, rhonchi, or rales. There is no use of accessory muscles. Abdomen: Abdomen is soft, nontender, nondistended. There is no firm or pulsatile masses, no rebound rigidity or guarding. Musculoskeletal: 5 out of 5 strength in all 4 extremities. No focal muscle deficits are appreciated  Neuro: Motor intact, sensory intact, level of consciousness is normal  Dermatology: Skin is warm and dry  Psych: Mentation is grossly normal, cognition is grossly normal. Affect is appropriate. DIAGNOSTIC RESULTS     EKG: All EKG's are interpreted by the Emergency Department Physician who either signs or Co-signs this chart in the [address] a cardiologist.    The EKG interpreted by me shows sinus rhythm. Ventricular rates 88 bpm, AL interval is 146 ms, QRS durations 95, QT is 361 and QTc is 437 seconds. No acute T wave inversions concerning for acute myocardial ischemia. No ST elevations concerning for acute myocardial infarction.     RADIOLOGY: Non-plain film images such as CT, Ultrasound and MRI are read by the radiologist. Plain radiographic images are visualized and preliminarily interpreted by the emergency physician with the below findings:      ? Radiologist's Report Reviewed:  XR CHEST PORTABLE   Final Result   1. No acute disease.             ED BEDSIDE ULTRASOUND:   Performed by ED Physician - none    LABS:    I have reviewed and interpreted all of the currently available lab results from this visit (ifapplicable):  Results for orders placed or performed during the hospital encounter of 07/19/21   CBC Auto Differential   Result Value Ref Range    WBC 5.3 4.0 - 11.0 K/uL    RBC 5.18 4.50 - 6.00 M/uL    Hemoglobin 15.5 13.0 - 18.0 g/dL    Hematocrit 48.1 40.0 - 54.0 %    MCV 92.9 80.0 - 100.0 fL    MCH 29.9 27.0 - 32.0 pg    MCHC 32.2 31.0 - 35.0 g/dL    RDW 12.3 11.0 - 16.0 %    Platelets 399 721 - 605 K/uL    MPV 10.2 (H) 6.0 - 10.0 fL    Neutrophils % 57.6 %    Immature Granulocytes % 0.4 0.0 - 5.0 %    Lymphocytes % 27.4 %    Monocytes % 8.6 %    Eosinophils % 4.9 %    Basophils % 1.1 %    Neutrophils Absolute 3.0 2.0 - 7.5 K/uL    Immature Granulocytes # 0.0 K/uL    Lymphocytes Absolute 1.4 (L) 1.5 - 4.0 K/uL    Monocytes Absolute 0.5 0.2 - 0.8 K/uL    Eosinophils Absolute 0.3 0.0 - 0.4 K/uL    Basophils Absolute 0.1 0.0 - 0.1 K/uL   Comprehensive Metabolic Panel w/ Reflex to MG   Result Value Ref Range    Sodium 138 136 - 145 mmol/L    Potassium reflex Magnesium 3.8 3.4 - 5.1 mmol/L    Chloride 101 98 - 107 mmol/L    CO2 26 20 - 30 mmol/L    Anion Gap 11 3 - 16    Glucose 123 (H) 74 - 106 mg/dL    BUN 22 (H) 6 - 20 mg/dL    CREATININE 0.9 0.4 - 1.2 mg/dL    GFR Non-African American >59 >59    GFR African American >59 >59    Calcium 9.4 8.5 - 10.5 mg/dL    Total Protein 7.7 6.4 - 8.3 g/dL    Albumin 4.7 3.4 - 4.8 g/dL    Albumin/Globulin Ratio 1.6 0.8 - 2.0    Total Bilirubin 0.8 0.3 - 1.2 mg/dL    Alkaline Phosphatase 98 25 - 100 U/L    ALT 33 4 - 36 U/L    AST 24 8 - 33 U/L    Globulin 3.0 g/dL   Troponin   Result Value Ref Range    Troponin <0.30 <0.30 ng/mL        All other labs were within normal range or not returned as of this dictation. EMERGENCY DEPARTMENT COURSE and DIFFERENTIAL DIAGNOSIS/MDM:   Vitals:    Vitals:    07/19/21 1634   BP: (!) 172/110   Pulse: 87   Resp: 16   Temp: 97.8 °F (36.6 °C)   TempSrc: Oral   SpO2: 97%   Weight: 183 lb (83 kg)   Height: 5' 11\" (1.803 m)       MEDICATIONS ADMINISTERED IN ED:  Medications   0.9 % sodium chloride bolus (1,000 mLs Intravenous New Bag 7/19/21 1702)       After initial evaluation and examination I did have a conversation with the patient about the upcoming plan, treatment possible disposition which she was agreeable to the times dictation. Patient is actually low risk for cardiovascular disease even though he is 46years old past medical history. Patient advised we give a fluid bolus normal saline 1 L just because of the dizziness that he had a couple of days ago but that has resolved since then. Patient also had portable chest radiograph performed since he had some chest pain also at that time which is also resolved has not had any chest pain again for 24 or more hours. Patient have CBC, CMP, troponin and a twelve-lead EKG performed. Patient's final disposition will be determined once his radiological diagnostic studies been performed reviewed. NIHSS=0    Blood work show white count 5300, hemoglobin 15.5, hematocrit 48.1, platelet counts 178. Comprehensive metabolic panel was benign except for glucose of 123. BUN was 22 slightly elevated but 20 being a normal baseline. Troponin was nondetectable less than 0.30. Portable chest radiograph read by radiology as no acute disease    Patient's radiological diagnostic studies were discussed with him and he does state his understanding.   Patient advised work-up was negative here except for his BUN being slightly elevated hard to say if be inappropriate. If there are any questions or concerns please feel free to contact the dictating providerfor clarification.     Francisca Irizarry DO  Attending Emergency Physician              Francisca Irizarry DO  07/19/21 4403

## 2021-08-30 ENCOUNTER — HOSPITAL ENCOUNTER (OUTPATIENT)
Dept: NURSING | Facility: HOSPITAL | Age: 51
Setting detail: INFUSION SERIES
Discharge: HOME | End: 2021-09-01
Payer: COMMERCIAL

## 2021-08-30 VITALS
HEART RATE: 80 BPM | RESPIRATION RATE: 18 BRPM | SYSTOLIC BLOOD PRESSURE: 120 MMHG | OXYGEN SATURATION: 95 % | DIASTOLIC BLOOD PRESSURE: 78 MMHG | TEMPERATURE: 97.8 F

## 2021-08-30 DIAGNOSIS — U07.1 COVID-19: Primary | ICD-10-CM

## 2021-08-30 PROCEDURE — 2580000003 HC RX 258: Performed by: FAMILY MEDICINE

## 2021-08-30 PROCEDURE — 96365 THER/PROPH/DIAG IV INF INIT: CPT

## 2021-08-30 RX ORDER — METHYLPREDNISOLONE SODIUM SUCCINATE 125 MG/2ML
125 INJECTION, POWDER, LYOPHILIZED, FOR SOLUTION INTRAMUSCULAR; INTRAVENOUS ONCE
OUTPATIENT
Start: 2021-08-30 | End: 2021-08-30

## 2021-08-30 RX ORDER — DIPHENHYDRAMINE HYDROCHLORIDE 50 MG/ML
50 INJECTION INTRAMUSCULAR; INTRAVENOUS ONCE
OUTPATIENT
Start: 2021-08-30 | End: 2021-08-30

## 2021-08-30 RX ORDER — SODIUM CHLORIDE 9 MG/ML
25 INJECTION, SOLUTION INTRAVENOUS PRN
OUTPATIENT
Start: 2021-08-30

## 2021-08-30 RX ORDER — SODIUM CHLORIDE 9 MG/ML
25 INJECTION, SOLUTION INTRAVENOUS PRN
Status: CANCELLED | OUTPATIENT
Start: 2021-08-30

## 2021-08-30 RX ORDER — EPINEPHRINE 1 MG/ML
0.3 INJECTION, SOLUTION, CONCENTRATE INTRAVENOUS PRN
OUTPATIENT
Start: 2021-08-30

## 2021-08-30 RX ORDER — SODIUM CHLORIDE 9 MG/ML
25 INJECTION, SOLUTION INTRAVENOUS PRN
Status: ACTIVE | OUTPATIENT
Start: 2021-08-30 | End: 2021-08-31

## 2021-08-30 RX ORDER — SODIUM CHLORIDE 9 MG/ML
INJECTION, SOLUTION INTRAVENOUS CONTINUOUS
OUTPATIENT
Start: 2021-08-30

## 2021-08-30 RX ADMIN — SODIUM CHLORIDE: 9 INJECTION, SOLUTION INTRAVENOUS at 14:21

## 2021-08-30 RX ADMIN — SODIUM CHLORIDE 25 ML: 9 INJECTION, SOLUTION INTRAVENOUS at 14:24

## 2021-08-30 NOTE — FLOWSHEET NOTE
Pt arrived to clinic for out pt Regen-cov infusion. RN utilizing proper aerosol droplet Covid precautions. Educated pt on infusion therapy. VSS. IV inserted, #20 in left arm. No complications at site and appears to be in good working condition. Pharmacy notified. Medications delivered. Infusions started. See MAR. Will continue to monitor pt throughout infusion.

## (undated) DEVICE — BNDG ELAS MATRX V/CLS 6IN 5YD LF

## (undated) DEVICE — STERILE CAST PADDING KIT: Brand: CARDINAL HEALTH

## (undated) DEVICE — OCCLUSIVE GAUZE STRIP,3% BISMUTH TRIBROMOPHENATE IN PETROLATUM BLEND: Brand: XEROFORM

## (undated) DEVICE — SUT VIC 0 CT 18IN VIL J752D

## (undated) DEVICE — SUT VIC 3/0 SH 27IN J416H

## (undated) DEVICE — BIT DRL QC DIA 3.5X110MM

## (undated) DEVICE — GLV SURG SENSICARE W/ALOE PF LF 8 STRL

## (undated) DEVICE — DRSNG SURESITE123 6X8IN

## (undated) DEVICE — PK EXTRM LOWR 20

## (undated) DEVICE — PROXIMATE SKIN STAPLERS (35 WIDE) CONTAINS 35 STAINLESS STEEL STAPLES (FIXED HEAD): Brand: PROXIMATE

## (undated) DEVICE — BIT DRL QC DIA 2.5X110MM

## (undated) DEVICE — GOWN,SIRUS,NON REINFRCD,LARGE,SET IN SL: Brand: MEDLINE

## (undated) DEVICE — DRSNG SURESITE WNDW 4X4.5

## (undated) DEVICE — SPNG GZ WOVN 4X4IN 12PLY 10/BX STRL

## (undated) DEVICE — CUFF SCD HEMOFORCE SEQ CALF STD MD

## (undated) DEVICE — FLEXIBLE YANKAUER,MEDIUM TIP, NO VACUUM CONTROL: Brand: ARGYLE

## (undated) DEVICE — BIT DRL QC DIA 2.8X165MM NS

## (undated) DEVICE — SUT VIC 2/0 CT1 27IN J259H

## (undated) DEVICE — CLAVICLE STRAP: Brand: DEROYAL